# Patient Record
Sex: FEMALE | Race: WHITE | NOT HISPANIC OR LATINO | Employment: FULL TIME | URBAN - METROPOLITAN AREA
[De-identification: names, ages, dates, MRNs, and addresses within clinical notes are randomized per-mention and may not be internally consistent; named-entity substitution may affect disease eponyms.]

---

## 2023-09-15 PROBLEM — N20.1 URETERIC STONE: Status: ACTIVE | Noted: 2023-09-15

## 2023-09-15 PROBLEM — I45.9 SKIPPED HEART BEATS: Status: ACTIVE | Noted: 2023-09-15

## 2023-09-15 PROBLEM — E78.1 PURE HYPERGLYCERIDEMIA: Status: ACTIVE | Noted: 2023-09-15

## 2023-09-15 PROBLEM — K21.9 GASTROESOPHAGEAL REFLUX DISEASE WITHOUT ESOPHAGITIS: Status: ACTIVE | Noted: 2023-09-15

## 2023-09-15 PROBLEM — R94.6 ABNORMAL THYROID FUNCTION TEST: Status: ACTIVE | Noted: 2023-09-15

## 2023-09-15 PROBLEM — N92.6 IRREGULAR MENSES: Status: ACTIVE | Noted: 2023-09-15

## 2023-09-15 PROBLEM — D84.9 IMMUNOSUPPRESSED STATUS (MULTI): Status: ACTIVE | Noted: 2023-09-15

## 2023-09-15 PROBLEM — N20.0 KIDNEY STONE: Status: ACTIVE | Noted: 2023-09-15

## 2023-09-15 PROBLEM — F41.9 ANXIETY: Status: ACTIVE | Noted: 2023-09-15

## 2023-09-15 PROBLEM — J45.909 ASTHMA (HHS-HCC): Status: ACTIVE | Noted: 2023-09-15

## 2023-09-15 PROBLEM — B00.9 HERPES SIMPLEX TYPE 1 INFECTION: Status: ACTIVE | Noted: 2023-09-15

## 2023-09-15 PROBLEM — L01.00 IMPETIGO: Status: ACTIVE | Noted: 2023-09-15

## 2023-09-15 PROBLEM — J20.8 ACUTE BRONCHITIS DUE TO OTHER SPECIFIED ORGANISMS: Status: ACTIVE | Noted: 2023-09-15

## 2023-09-15 RX ORDER — VALACYCLOVIR HYDROCHLORIDE 500 MG/1
1 TABLET, FILM COATED ORAL DAILY
COMMUNITY
Start: 2018-02-14 | End: 2023-10-16

## 2023-09-15 RX ORDER — ALBUTEROL SULFATE 0.83 MG/ML
3 SOLUTION RESPIRATORY (INHALATION) EVERY 8 HOURS PRN
COMMUNITY
Start: 2016-03-15

## 2023-09-15 RX ORDER — ALBUTEROL SULFATE 90 UG/1
2 AEROSOL, METERED RESPIRATORY (INHALATION) EVERY 6 HOURS PRN
COMMUNITY
Start: 2016-03-15 | End: 2023-12-15

## 2023-09-15 RX ORDER — BUDESONIDE AND FORMOTEROL FUMARATE DIHYDRATE 160; 4.5 UG/1; UG/1
2 AEROSOL RESPIRATORY (INHALATION)
COMMUNITY
Start: 2016-03-15

## 2023-09-15 RX ORDER — CIPROFLOXACIN 500 MG/1
1 TABLET ORAL 2 TIMES DAILY
COMMUNITY
End: 2023-10-24 | Stop reason: ALTCHOICE

## 2023-09-15 RX ORDER — MEDROXYPROGESTERONE ACETATE 150 MG/ML
1 INJECTION, SUSPENSION INTRAMUSCULAR
COMMUNITY
Start: 2021-01-13 | End: 2023-10-24 | Stop reason: ALTCHOICE

## 2023-09-15 RX ORDER — NORETHINDRONE ACETATE AND ETHINYL ESTRADIOL, ETHINYL ESTRADIOL AND FERROUS FUMARATE 1MG-10(24)
1 KIT ORAL DAILY
COMMUNITY
Start: 2018-02-14 | End: 2023-10-24 | Stop reason: ALTCHOICE

## 2023-09-15 RX ORDER — OMEPRAZOLE 40 MG/1
1 CAPSULE, DELAYED RELEASE ORAL
COMMUNITY
Start: 2020-06-26

## 2023-09-15 RX ORDER — ALPRAZOLAM 0.25 MG/1
1 TABLET ORAL DAILY
COMMUNITY
Start: 2023-02-16

## 2023-09-15 RX ORDER — TAMSULOSIN HYDROCHLORIDE 0.4 MG/1
1 CAPSULE ORAL DAILY
COMMUNITY
End: 2023-10-24 | Stop reason: ALTCHOICE

## 2023-09-15 RX ORDER — PHENTERMINE HCL 100 %
1 POWDER (GRAM) MISCELLANEOUS DAILY
COMMUNITY
End: 2023-10-24 | Stop reason: ALTCHOICE

## 2023-09-15 RX ORDER — IPRATROPIUM BROMIDE AND ALBUTEROL SULFATE 2.5; .5 MG/3ML; MG/3ML
SOLUTION RESPIRATORY (INHALATION)
COMMUNITY
Start: 2016-03-15 | End: 2023-10-24 | Stop reason: ALTCHOICE

## 2023-09-15 RX ORDER — MONTELUKAST SODIUM 10 MG/1
1 TABLET ORAL DAILY
COMMUNITY
Start: 2019-11-13

## 2023-09-15 RX ORDER — MECLIZINE HYDROCHLORIDE 25 MG/1
1 TABLET ORAL DAILY PRN
COMMUNITY
Start: 2021-12-28 | End: 2023-10-24 | Stop reason: ALTCHOICE

## 2023-09-15 RX ORDER — HYDROCODONE BITARTRATE AND ACETAMINOPHEN 5; 325 MG/1; MG/1
1 TABLET ORAL EVERY 6 HOURS PRN
COMMUNITY
End: 2023-10-24 | Stop reason: ALTCHOICE

## 2023-09-15 RX ORDER — SERTRALINE HYDROCHLORIDE 100 MG/1
TABLET, FILM COATED ORAL DAILY
COMMUNITY
Start: 2020-08-31 | End: 2024-03-01

## 2023-09-15 RX ORDER — EPINEPHRINE 0.3 MG/.3ML
INJECTION INTRAMUSCULAR
COMMUNITY

## 2023-10-11 RX ORDER — SERTRALINE HYDROCHLORIDE 100 MG/1
100 TABLET, FILM COATED ORAL DAILY
COMMUNITY
Start: 2020-08-31 | End: 2023-10-24 | Stop reason: ALTCHOICE

## 2023-10-17 ENCOUNTER — OFFICE VISIT (OUTPATIENT)
Dept: PRIMARY CARE | Facility: CLINIC | Age: 28
End: 2023-10-17
Payer: COMMERCIAL

## 2023-10-17 VITALS
OXYGEN SATURATION: 98 % | SYSTOLIC BLOOD PRESSURE: 110 MMHG | BODY MASS INDEX: 38.51 KG/M2 | RESPIRATION RATE: 18 BRPM | HEART RATE: 97 BPM | HEIGHT: 61 IN | WEIGHT: 204 LBS | DIASTOLIC BLOOD PRESSURE: 60 MMHG

## 2023-10-17 DIAGNOSIS — E66.09 CLASS 2 OBESITY DUE TO EXCESS CALORIES WITHOUT SERIOUS COMORBIDITY WITH BODY MASS INDEX (BMI) OF 38.0 TO 38.9 IN ADULT: ICD-10-CM

## 2023-10-17 DIAGNOSIS — Z72.0 TOBACCO USE: ICD-10-CM

## 2023-10-17 DIAGNOSIS — R30.0 DYSURIA: Primary | ICD-10-CM

## 2023-10-17 DIAGNOSIS — A74.9 CHLAMYDIA INFECTION: ICD-10-CM

## 2023-10-17 LAB
POC APPEARANCE, URINE: CLEAR
POC BILIRUBIN, URINE: NEGATIVE
POC BLOOD, URINE: NEGATIVE
POC COLOR, URINE: YELLOW
POC GLUCOSE, URINE: NEGATIVE MG/DL
POC KETONES, URINE: NEGATIVE MG/DL
POC LEUKOCYTES, URINE: NEGATIVE
POC NITRITE,URINE: NEGATIVE
POC PH, URINE: 7 PH
POC PROTEIN, URINE: ABNORMAL MG/DL
POC SPECIFIC GRAVITY, URINE: 1.02
POC UROBILINOGEN, URINE: 0.2 EU/DL

## 2023-10-17 PROCEDURE — 81002 URINALYSIS NONAUTO W/O SCOPE: CPT | Performed by: NURSE PRACTITIONER

## 2023-10-17 PROCEDURE — 87800 DETECT AGNT MULT DNA DIREC: CPT | Performed by: NURSE PRACTITIONER

## 2023-10-17 PROCEDURE — 99214 OFFICE O/P EST MOD 30 MIN: CPT | Performed by: NURSE PRACTITIONER

## 2023-10-17 PROCEDURE — 3008F BODY MASS INDEX DOCD: CPT | Performed by: NURSE PRACTITIONER

## 2023-10-17 PROCEDURE — 87086 URINE CULTURE/COLONY COUNT: CPT | Performed by: NURSE PRACTITIONER

## 2023-10-17 RX ORDER — VALACYCLOVIR HYDROCHLORIDE 1 G/1
TABLET, FILM COATED ORAL DAILY
COMMUNITY
Start: 2023-04-13 | End: 2023-11-09 | Stop reason: SDUPTHER

## 2023-10-17 RX ORDER — BUPROPION HYDROCHLORIDE 150 MG/1
150 TABLET ORAL EVERY MORNING
Qty: 30 TABLET | Refills: 1 | Status: SHIPPED | OUTPATIENT
Start: 2023-10-17 | End: 2023-11-20

## 2023-10-17 RX ORDER — SEMAGLUTIDE 0.5 MG/.5ML
0.5 INJECTION, SOLUTION SUBCUTANEOUS
Qty: 2 ML | Refills: 2 | Status: SHIPPED | OUTPATIENT
Start: 2023-10-17

## 2023-10-17 ASSESSMENT — PATIENT HEALTH QUESTIONNAIRE - PHQ9
1. LITTLE INTEREST OR PLEASURE IN DOING THINGS: NOT AT ALL
SUM OF ALL RESPONSES TO PHQ9 QUESTIONS 1 AND 2: 0
2. FEELING DOWN, DEPRESSED OR HOPELESS: NOT AT ALL

## 2023-10-17 ASSESSMENT — ENCOUNTER SYMPTOMS
LOSS OF SENSATION IN FEET: 0
DEPRESSION: 0
OCCASIONAL FEELINGS OF UNSTEADINESS: 0

## 2023-10-17 NOTE — PROGRESS NOTES
"Subjective   Patient ID: Magda Negron is a 27 y.o. female who presents for UTI (Also c/o of weight loss and trying to quit smoking.).    HPI concern about weight gain and discuss weight loss options Hx PCOS, recent burning with urination. New sexual partner. Hx of chymadia  wants to stop smoking     Review of Systems    Objective   /60 (BP Location: Right arm, Patient Position: Sitting, BP Cuff Size: Adult)   Pulse 97   Resp 18   Ht 1.549 m (5' 1\")   Wt 92.5 kg (204 lb)   SpO2 98%   BMI 38.55 kg/m²     Physical Exam  Constitutional:       Appearance: She is obese.   Cardiovascular:      Rate and Rhythm: Normal rate and regular rhythm.   Pulmonary:      Effort: Pulmonary effort is normal.      Breath sounds: Normal breath sounds.   Abdominal:      General: Bowel sounds are normal.      Palpations: Abdomen is soft.      Tenderness: There is no abdominal tenderness.   Skin:     General: Skin is warm.   Neurological:      Mental Status: She is alert.   Psychiatric:         Mood and Affect: Mood normal.         Assessment/Plan   Diagnoses and all orders for this visit:  Dysuria  -     POCT UA (nonautomated) manually resulted  -     C. Trachomatis / N. Gonorrhoeae, Amplified Detection  -     Urine Culture  Tobacco use  -     buPROPion XL (Wellbutrin XL) 150 mg 24 hr tablet; Take 1 tablet (150 mg) by mouth once daily in the morning. Do not crush, chew, or split.  Class 2 obesity due to excess calories without serious comorbidity with body mass index (BMI) of 38.0 to 38.9 in adult  -     semaglutide, weight loss, (Wegovy) 0.5 mg/0.5 mL pen injector; Inject 0.5 mg under the skin every 7 days.  Chlamydia infection  -     doxycycline (Monodox) 100 mg capsule; Take 1 capsule (100 mg) by mouth 2 times a day for 10 days. Take with at least 8 ounces (large glass) of water, do not lie down for 30 minutes after         "

## 2023-10-18 LAB
C TRACH RRNA SPEC QL NAA+PROBE: POSITIVE
N GONORRHOEA DNA SPEC QL PROBE+SIG AMP: NEGATIVE

## 2023-10-18 RX ORDER — DOXYCYCLINE 100 MG/1
100 CAPSULE ORAL 2 TIMES DAILY
Qty: 20 CAPSULE | Refills: 0 | Status: SHIPPED | OUTPATIENT
Start: 2023-10-18 | End: 2023-10-24 | Stop reason: ALTCHOICE

## 2023-10-19 LAB — BACTERIA UR CULT: NORMAL

## 2023-10-24 ENCOUNTER — OFFICE VISIT (OUTPATIENT)
Dept: OTOLARYNGOLOGY | Facility: CLINIC | Age: 28
End: 2023-10-24
Payer: COMMERCIAL

## 2023-10-24 VITALS — WEIGHT: 200 LBS | BODY MASS INDEX: 36.8 KG/M2 | HEIGHT: 62 IN

## 2023-10-24 DIAGNOSIS — G47.33 OBSTRUCTIVE SLEEP APNEA: ICD-10-CM

## 2023-10-24 DIAGNOSIS — J03.91 RECURRENT TONSILLITIS: Primary | ICD-10-CM

## 2023-10-24 DIAGNOSIS — R06.83 SNORING: ICD-10-CM

## 2023-10-24 DIAGNOSIS — R40.0 DAYTIME SOMNOLENCE: ICD-10-CM

## 2023-10-24 PROCEDURE — 4004F PT TOBACCO SCREEN RCVD TLK: CPT | Performed by: OTOLARYNGOLOGY

## 2023-10-24 PROCEDURE — 3008F BODY MASS INDEX DOCD: CPT | Performed by: OTOLARYNGOLOGY

## 2023-10-24 PROCEDURE — 99203 OFFICE O/P NEW LOW 30 MIN: CPT | Performed by: OTOLARYNGOLOGY

## 2023-10-24 NOTE — PROGRESS NOTES
Chief Complaint   Patient presents with    New Patient Visit     NP- TONSIL CK     HPI:  Magda Negron is a 27 y.o. female who presents with complaints of getting some recurrent tonsillitis over the past year or 2.  Has happened about 4 times a year.  Symptoms are white spots on the throat with sore throat and feeling ill.  No complications.  Negative strep.  Gets better with a Z-Rkishna.  Also having some significant snoring, possible sleep apnea, daytime somnolence.    PMH:  Past Medical History:   Diagnosis Date    Allergy to peanuts     History of peanut allergy    Allergy to seafood     History of allergy to seafood    Asthma     GERD (gastroesophageal reflux disease)     Headache     Obesity      Past Surgical History:   Procedure Laterality Date    KIDNEY STONE SURGERY  2021    OTHER SURGICAL HISTORY  07/16/2018    Treatment Of Elbow Fracture    WISDOM TOOTH EXTRACTION  2022         Medications:     Current Outpatient Medications:     albuterol 2.5 mg /3 mL (0.083 %) nebulizer solution, Take 3 mL by nebulization every 8 hours if needed., Disp: , Rfl:     albuterol 90 mcg/actuation inhaler, Inhale 2 puffs every 6 hours if needed., Disp: , Rfl:     ALPRAZolam (Xanax) 0.25 mg tablet, Take 1 tablet (0.25 mg) by mouth once daily., Disp: , Rfl:     budesonide-formoteroL (Symbicort) 160-4.5 mcg/actuation inhaler, Inhale 2 puffs 2 times a day., Disp: , Rfl:     buPROPion XL (Wellbutrin XL) 150 mg 24 hr tablet, Take 1 tablet (150 mg) by mouth once daily in the morning. Do not crush, chew, or split., Disp: 30 tablet, Rfl: 1    diazePAM (Valium) 10 mg tablet, Take 1 tablet (10 mg) by mouth every 6 hours if needed., Disp: , Rfl:     EPINEPHrine (EpiPen) 0.3 mg/0.3 mL injection syringe, as directed Injection as directed, Disp: , Rfl:     montelukast (Singulair) 10 mg tablet, Take 1 tablet (10 mg) by mouth once daily., Disp: , Rfl:     Nexplanon 68 mg implant implant, 1 each by subdermal route 1 time., Disp: , Rfl:      "omeprazole (PriLOSEC) 40 mg DR capsule, Take 1 capsule (40 mg) by mouth once daily in the morning. Take before meals., Disp: , Rfl:     semaglutide, weight loss, (Wegovy) 0.5 mg/0.5 mL pen injector, Inject 0.5 mg under the skin every 7 days., Disp: 2 mL, Rfl: 2    sertraline (Zoloft) 100 mg tablet, Take by mouth once daily., Disp: , Rfl:     valACYclovir (Valtrex) 1 gram tablet, Take by mouth once daily., Disp: , Rfl:      Allergies:  Allergies   Allergen Reactions    Peanut Anaphylaxis    Penicillin Anaphylaxis and Hives    Penicillins Hives and Rash    Amoxicillin Unknown     SEVERITY UNKNOWN    Shellfish Derived Unknown     SEVERITY UNKNOWN        ROS:  Review of systems normal unless stated otherwise in the HPI and/or PMH.    Physical Exam:  Height 1.575 m (5' 2\"), weight 90.7 kg (200 lb). Body mass index is 36.58 kg/m².     GENERAL APPEARANCE: Well developed and well nourished.  Alert and oriented in no acute distress.  Normal vocal quality.      HEAD/FACE: No erythema or edema or facial tenderness.  Normal facial nerve function bilaterally.    EAR:       EXTERNAL: Normal pinnas and external auditory canals without lesion or obstructing wax.       MIDDLE EAR: Tympanic membranes intact and mobile with normal landmarks.  Middle ear space appears well aerated.       TUBE STATUS: N/A       MASTOID CAVITY: N/A       HEARING: Gross hearing assessment is within normal limits.      NOSE:       VISUALIZED USING: Anterior rhinoscopy with headlight and nasal speculum.       DORSUM: Midline, nontraumatic appearance.       MUCOSA: Normal-appearing.       SECRETIONS: Normal.       SEPTUM: Midline and nonobstructing.       INFERIOR TURBINATES: Normal.       MIDDLE TURBINATES/MEATUS: N/A       BLEEDING: N/A         ORAL CAVITY/PHARYNX:       TEETH: Adequate dentition.       TONGUE: No mass or lesion.  Normal mobility.       FLOOR OF MOUTH: No mass or lesion.       PALATE: Normal hard palate, soft palate, and uvula.  Mallampati " 3-4       OROPHARYNX: Normal without mass or lesion.  Tonsils 2+       BUCCAL MUCOSA/GBS: Normal without mass or lesion.       LIPS: Normal.    LARYNX/HYPOPHARYNX/NASOPHARYNX: N/A    NECK: No palpable masses or abnormal adenopathy.  Trachea is midline.    THYROID: No thyromegaly or palpable nodule.    SALIVARY GLANDS: Normal bilateral parotid and submandibular glands by inspection and palpation.    TMJ's: Normal.    NEURO: Cranial nerve exam grossly normal bilaterally.       Assessment/Plan   Magda was seen today for new patient visit.  Diagnoses and all orders for this visit:  Recurrent tonsillitis (Primary)  Obstructive sleep apnea  -     Home sleep apnea test (HSAT)  Snoring  Daytime somnolence     Having some recurrent tonsillitis but at this point not to the degree to recommend tonsillectomy.  Recommend tonsil with control, healthy lifestyle, eating habits, vitamins, and avoidance of sick exposures.  Clinically has a history of obstructive sleep apnea.  Educated about this.  We will check a sleep study.  Follow up for test results after testing is complete.     Mirza Wen MD

## 2023-10-25 ENCOUNTER — PROCEDURE VISIT (OUTPATIENT)
Dept: OBSTETRICS AND GYNECOLOGY | Facility: CLINIC | Age: 28
End: 2023-10-25
Payer: COMMERCIAL

## 2023-10-25 VITALS — DIASTOLIC BLOOD PRESSURE: 84 MMHG | SYSTOLIC BLOOD PRESSURE: 122 MMHG | BODY MASS INDEX: 38.23 KG/M2 | WEIGHT: 209 LBS

## 2023-10-25 DIAGNOSIS — Z20.2 CHLAMYDIA CONTACT, TREATED: Primary | ICD-10-CM

## 2023-10-25 DIAGNOSIS — Z30.017 NEXPLANON INSERTION: ICD-10-CM

## 2023-10-25 LAB — PREGNANCY TEST URINE, POC: NEGATIVE

## 2023-10-25 PROCEDURE — 11981 INSERTION DRUG DLVR IMPLANT: CPT | Performed by: OBSTETRICS & GYNECOLOGY

## 2023-10-25 PROCEDURE — 2500000004 HC RX 250 GENERAL PHARMACY W/ HCPCS (ALT 636 FOR OP/ED): Performed by: OBSTETRICS & GYNECOLOGY

## 2023-10-25 PROCEDURE — 99999 PR OFFICE/OUTPT VISIT,PROCEDURE ONLY: CPT | Performed by: OBSTETRICS & GYNECOLOGY

## 2023-10-25 PROCEDURE — 81025 URINE PREGNANCY TEST: CPT | Performed by: OBSTETRICS & GYNECOLOGY

## 2023-10-25 RX ADMIN — ETONOGESTREL 1 EACH: 68 IMPLANT SUBCUTANEOUS at 10:44

## 2023-10-25 ASSESSMENT — PATIENT HEALTH QUESTIONNAIRE - PHQ9
2. FEELING DOWN, DEPRESSED OR HOPELESS: NOT AT ALL
SUM OF ALL RESPONSES TO PHQ9 QUESTIONS 1 AND 2: 0
1. LITTLE INTEREST OR PLEASURE IN DOING THINGS: NOT AT ALL

## 2023-10-25 ASSESSMENT — ENCOUNTER SYMPTOMS
LOSS OF SENSATION IN FEET: 0
DEPRESSION: 0
OCCASIONAL FEELINGS OF UNSTEADINESS: 0

## 2023-10-25 NOTE — PROGRESS NOTES
Patient ID: Magda Negron is a 27 y.o. female.    Insertion of Contraceptive Capsule    Date/Time: 10/25/2023 10:45 AM    Performed by: Janice Kovacs DO  Authorized by: Janice Kovacs DO    Consent:     Consent obtained:  Verbal and written    Consent given by:  Patient    Procedural risks discussed:  Bleeding, failure rate and repeat procedure    Patient questions answered: yes      Patient agrees, verbalizes understanding, and wants to proceed: yes      Educational handouts given: yes      Instructions and paperwork completed: yes    Indication:     Indication: Insertion of non-biodegradable drug delivery implant    Pre-procedure:     Pre-procedure timeout performed: yes      Prepped with: povidone-iodine      Local anesthetic:  Lidocaine with epinephrine    The site was cleaned and prepped in a sterile fashion: yes    Procedure:     Procedure:  Insertion    Small stab incision was made in arm: yes      Preloaded contraceptive capsule trocar was placed subdermally: yes      Visualization of implant was obtained: yes      Contraceptive capsule was inserted and trocar removed: yes      Visualization of notch in stylet and palpation of device: yes      Palpation confirms placement by provider and patient: yes      Site was closed with steri-strips and pressure bandage applied: yes

## 2023-11-09 DIAGNOSIS — B00.9 HERPES SIMPLEX TYPE 1 INFECTION: Primary | ICD-10-CM

## 2023-11-09 RX ORDER — VALACYCLOVIR HYDROCHLORIDE 1 G/1
500 TABLET, FILM COATED ORAL DAILY
Qty: 30 TABLET | Refills: 6 | Status: SHIPPED | OUTPATIENT
Start: 2023-11-09

## 2023-11-10 ENCOUNTER — DOCUMENTATION (OUTPATIENT)
Dept: PRIMARY CARE | Facility: CLINIC | Age: 28
End: 2023-11-10
Payer: COMMERCIAL

## 2023-11-10 NOTE — PROGRESS NOTES
Pt called answering service with complaints of diarrhea and vomiting.  Pt reported that she had vomited 2 times but now was able to keep clear liquids down.  Pt reported that when she eats, the pain is worse.  She reported pain to be epigastric.  Advised pt to maintain clear liquid diet for the next 24 hours to allow stomach to rest.  Maintain hydration d/t diarrhea.  Told her she could take an OTC antacid and imodium.  If s/s continue contact office in the morning to schedule appointment.

## 2023-11-19 DIAGNOSIS — Z72.0 TOBACCO USE: ICD-10-CM

## 2023-11-20 RX ORDER — BUPROPION HYDROCHLORIDE 150 MG/1
150 TABLET ORAL EVERY MORNING
Qty: 30 TABLET | Refills: 6 | Status: SHIPPED | OUTPATIENT
Start: 2023-11-20 | End: 2024-03-19

## 2023-12-14 DIAGNOSIS — J45.909 UNSPECIFIED ASTHMA, UNCOMPLICATED (HHS-HCC): ICD-10-CM

## 2023-12-15 RX ORDER — ALBUTEROL SULFATE 90 UG/1
AEROSOL, METERED RESPIRATORY (INHALATION)
Qty: 18 G | Refills: 6 | Status: SHIPPED | OUTPATIENT
Start: 2023-12-15

## 2024-01-04 ENCOUNTER — TELEPHONE (OUTPATIENT)
Dept: PRIMARY CARE | Facility: CLINIC | Age: 29
End: 2024-01-04
Payer: COMMERCIAL

## 2024-01-22 ENCOUNTER — CLINICAL SUPPORT (OUTPATIENT)
Dept: SLEEP MEDICINE | Facility: HOSPITAL | Age: 29
End: 2024-01-22
Payer: COMMERCIAL

## 2024-01-22 DIAGNOSIS — G47.33 OBSTRUCTIVE SLEEP APNEA: Primary | ICD-10-CM

## 2024-01-22 PROCEDURE — 95806 SLEEP STUDY UNATT&RESP EFFT: CPT | Performed by: PSYCHIATRY & NEUROLOGY

## 2024-03-01 DIAGNOSIS — F41.9 ANXIETY DISORDER, UNSPECIFIED: ICD-10-CM

## 2024-03-01 RX ORDER — SERTRALINE HYDROCHLORIDE 100 MG/1
50 TABLET, FILM COATED ORAL DAILY
Qty: 45 TABLET | Refills: 3 | Status: SHIPPED | OUTPATIENT
Start: 2024-03-01

## 2024-03-18 ENCOUNTER — E-VISIT (OUTPATIENT)
Dept: PRIMARY CARE | Facility: CLINIC | Age: 29
End: 2024-03-18

## 2024-03-18 ENCOUNTER — HOSPITAL ENCOUNTER (OUTPATIENT)
Dept: RADIOLOGY | Facility: CLINIC | Age: 29
Discharge: HOME | End: 2024-03-18
Payer: COMMERCIAL

## 2024-03-18 ENCOUNTER — OFFICE VISIT (OUTPATIENT)
Dept: PRIMARY CARE | Facility: CLINIC | Age: 29
End: 2024-03-18
Payer: COMMERCIAL

## 2024-03-18 VITALS — WEIGHT: 230 LBS | BODY MASS INDEX: 42.07 KG/M2

## 2024-03-18 DIAGNOSIS — F50.81 BINGE EATING DISORDER: Primary | ICD-10-CM

## 2024-03-18 DIAGNOSIS — J45.30 MILD PERSISTENT ASTHMA WITHOUT COMPLICATION (HHS-HCC): ICD-10-CM

## 2024-03-18 DIAGNOSIS — R06.02 SHORTNESS OF BREATH: ICD-10-CM

## 2024-03-18 DIAGNOSIS — N92.6 IRREGULAR MENSES: ICD-10-CM

## 2024-03-18 DIAGNOSIS — F50.81 BINGE EATING DISORDER: ICD-10-CM

## 2024-03-18 LAB
ALBUMIN SERPL-MCNC: 4.1 G/DL (ref 3.5–5)
ALP BLD-CCNC: 55 U/L (ref 35–125)
ALT SERPL-CCNC: 34 U/L (ref 5–40)
ANION GAP SERPL CALC-SCNC: 14 MMOL/L
AST SERPL-CCNC: 20 U/L (ref 5–40)
BILIRUB SERPL-MCNC: 0.3 MG/DL (ref 0.1–1.2)
BUN SERPL-MCNC: 14 MG/DL (ref 8–25)
CALCIUM SERPL-MCNC: 9.3 MG/DL (ref 8.5–10.4)
CHLORIDE SERPL-SCNC: 105 MMOL/L (ref 97–107)
CHOLEST SERPL-MCNC: 215 MG/DL (ref 133–200)
CHOLEST/HDLC SERPL: 4.8 {RATIO}
CO2 SERPL-SCNC: 23 MMOL/L (ref 24–31)
CREAT SERPL-MCNC: 0.7 MG/DL (ref 0.4–1.6)
EGFRCR SERPLBLD CKD-EPI 2021: >90 ML/MIN/1.73M*2
EST. AVERAGE GLUCOSE BLD GHB EST-MCNC: 105 MG/DL
GLUCOSE SERPL-MCNC: 86 MG/DL (ref 65–99)
HBA1C MFR BLD: 5.3 %
HDLC SERPL-MCNC: 45 MG/DL
INSULIN SERPL-ACNC: 25 UIU/ML (ref 3–25)
LDLC SERPL CALC-MCNC: 127 MG/DL (ref 65–130)
POTASSIUM SERPL-SCNC: 4.3 MMOL/L (ref 3.4–5.1)
PROT SERPL-MCNC: 7.1 G/DL (ref 5.9–7.9)
SODIUM SERPL-SCNC: 142 MMOL/L (ref 133–145)
TRIGL SERPL-MCNC: 217 MG/DL (ref 40–150)
TSH SERPL DL<=0.05 MIU/L-ACNC: 1.29 MIU/L (ref 0.27–4.2)

## 2024-03-18 PROCEDURE — 99214 OFFICE O/P EST MOD 30 MIN: CPT | Performed by: NURSE PRACTITIONER

## 2024-03-18 PROCEDURE — 84443 ASSAY THYROID STIM HORMONE: CPT | Performed by: NURSE PRACTITIONER

## 2024-03-18 PROCEDURE — 3008F BODY MASS INDEX DOCD: CPT | Performed by: NURSE PRACTITIONER

## 2024-03-18 PROCEDURE — 83525 ASSAY OF INSULIN: CPT | Mod: WESLAB | Performed by: NURSE PRACTITIONER

## 2024-03-18 PROCEDURE — 71046 X-RAY EXAM CHEST 2 VIEWS: CPT

## 2024-03-18 PROCEDURE — 83036 HEMOGLOBIN GLYCOSYLATED A1C: CPT | Performed by: NURSE PRACTITIONER

## 2024-03-18 PROCEDURE — 80053 COMPREHEN METABOLIC PANEL: CPT | Performed by: NURSE PRACTITIONER

## 2024-03-18 PROCEDURE — 80061 LIPID PANEL: CPT | Performed by: NURSE PRACTITIONER

## 2024-03-18 PROCEDURE — 36415 COLL VENOUS BLD VENIPUNCTURE: CPT | Performed by: NURSE PRACTITIONER

## 2024-03-18 RX ORDER — LISDEXAMFETAMINE DIMESYLATE 30 MG/1
30 CAPSULE ORAL EVERY MORNING
Qty: 30 CAPSULE | Refills: 0 | Status: SHIPPED | OUTPATIENT
Start: 2024-03-18 | End: 2024-03-19 | Stop reason: SDUPTHER

## 2024-03-18 ASSESSMENT — PATIENT HEALTH QUESTIONNAIRE - PHQ9
SUM OF ALL RESPONSES TO PHQ9 QUESTIONS 1 AND 2: 0
2. FEELING DOWN, DEPRESSED OR HOPELESS: NOT AT ALL
1. LITTLE INTEREST OR PLEASURE IN DOING THINGS: NOT AT ALL

## 2024-03-18 ASSESSMENT — PAIN SCALES - GENERAL: PAINLEVEL: 0-NO PAIN

## 2024-03-18 NOTE — PROGRESS NOTES
Subjective   Patient ID: Magda Negron is a 28 y.o. female who presents for Asthma (Patient here for asthma, she is wondering if her weight might be effecting it.). Issues with weight gain after going to health clinic for 3 months Semaglutide felt side effects wellbutrin stopped smoking , had 20 pound weight gain. Previous on phetermin but when she stopped she gained weight. Having fatigue  Asthma flaring up and symbicort is too costly a    HPI     Review of Systems    Objective   Wt 104 kg (230 lb)   BMI 42.07 kg/m²     Physical Exam  Vitals reviewed.   Constitutional:       General: She is not in acute distress.     Appearance: Normal appearance. She is obese.   Cardiovascular:      Rate and Rhythm: Normal rate and regular rhythm.      Heart sounds: No murmur heard.  Pulmonary:      Breath sounds: Normal breath sounds. No wheezing.   Abdominal:      Palpations: Abdomen is soft.   Musculoskeletal:         General: Normal range of motion.      Cervical back: Normal range of motion.   Skin:     General: Skin is warm.   Neurological:      Mental Status: She is alert.         Assessment/Plan   Problem List Items Addressed This Visit             ICD-10-CM    Irregular menses N92.6    Asthma J45.909    Relevant Medications    albuterol-budesonide (Airsupra) 90-80 mcg/actuation inhaler    Other Relevant Orders    Referral to Pulmonology    XR chest 2 views     Other Visit Diagnoses         Codes    Binge eating disorder    -  Primary F50.81    Relevant Medications    lisdexamfetamine (Vyvanse) 30 mg capsule    BMI 40.0-44.9, adult (CMS/HCC)     Z68.41    Relevant Orders    Hemoglobin A1C    Comprehensive Metabolic Panel    Lipid Panel    TSH with reflex to Free T4 if abnormal    Insulin, random    Shortness of breath     R06.02    Relevant Orders    Referral to Pulmonology    XR chest 2 views

## 2024-03-19 RX ORDER — LISDEXAMFETAMINE DIMESYLATE 30 MG/1
30 CAPSULE ORAL EVERY MORNING
Qty: 30 CAPSULE | Refills: 0 | Status: SHIPPED | OUTPATIENT
Start: 2024-03-19 | End: 2024-03-19 | Stop reason: SDUPTHER

## 2024-03-19 RX ORDER — LISDEXAMFETAMINE DIMESYLATE 30 MG/1
30 CAPSULE ORAL EVERY MORNING
Qty: 30 CAPSULE | Refills: 0 | Status: SHIPPED | OUTPATIENT
Start: 2024-03-19 | End: 2024-03-21

## 2024-03-20 DIAGNOSIS — E88.819 INSULIN RESISTANCE: Primary | ICD-10-CM

## 2024-03-20 RX ORDER — LIRAGLUTIDE 6 MG/ML
INJECTION, SOLUTION SUBCUTANEOUS
Qty: 15 ML | Refills: 2 | Status: SHIPPED | OUTPATIENT
Start: 2024-03-20

## 2024-03-20 RX ORDER — LIRAGLUTIDE 6 MG/ML
3 INJECTION, SOLUTION SUBCUTANEOUS DAILY
Qty: 15 ML | Refills: 2 | Status: SHIPPED | OUTPATIENT
Start: 2024-03-20 | End: 2024-06-18

## 2024-03-21 ENCOUNTER — TELEPHONE (OUTPATIENT)
Dept: PRIMARY CARE | Facility: CLINIC | Age: 29
End: 2024-03-21
Payer: COMMERCIAL

## 2024-03-21 RX ORDER — LISDEXAMFETAMINE DIMESYLATE 30 MG/1
30 CAPSULE ORAL EVERY MORNING
Qty: 30 CAPSULE | Refills: 0 | Status: SHIPPED | OUTPATIENT
Start: 2024-03-21 | End: 2024-04-19 | Stop reason: SDUPTHER

## 2024-04-05 ENCOUNTER — TELEMEDICINE (OUTPATIENT)
Dept: PRIMARY CARE | Facility: CLINIC | Age: 29
End: 2024-04-05
Payer: COMMERCIAL

## 2024-04-05 DIAGNOSIS — J20.8 ACUTE BRONCHITIS DUE TO OTHER SPECIFIED ORGANISMS: Primary | ICD-10-CM

## 2024-04-05 DIAGNOSIS — R06.2 WHEEZING: ICD-10-CM

## 2024-04-05 PROCEDURE — 99213 OFFICE O/P EST LOW 20 MIN: CPT | Performed by: NURSE PRACTITIONER

## 2024-04-05 PROCEDURE — 3008F BODY MASS INDEX DOCD: CPT | Performed by: NURSE PRACTITIONER

## 2024-04-05 RX ORDER — ALBUTEROL SULFATE 90 UG/1
2 AEROSOL, METERED RESPIRATORY (INHALATION) EVERY 4 HOURS PRN
Qty: 8 G | Refills: 11 | Status: SHIPPED | OUTPATIENT
Start: 2024-04-05 | End: 2025-04-05

## 2024-04-05 RX ORDER — BENZONATATE 100 MG/1
100 CAPSULE ORAL 3 TIMES DAILY PRN
Qty: 42 CAPSULE | Refills: 0 | Status: SHIPPED | OUTPATIENT
Start: 2024-04-05 | End: 2024-05-05

## 2024-04-05 RX ORDER — DOXYCYCLINE 100 MG/1
100 CAPSULE ORAL 2 TIMES DAILY
Qty: 20 CAPSULE | Refills: 0 | Status: SHIPPED | OUTPATIENT
Start: 2024-04-05 | End: 2024-04-15

## 2024-04-05 RX ORDER — METHYLPREDNISOLONE 4 MG/1
TABLET ORAL
Qty: 21 TABLET | Refills: 0 | Status: SHIPPED | OUTPATIENT
Start: 2024-04-05

## 2024-04-05 ASSESSMENT — ENCOUNTER SYMPTOMS
HEADACHES: 0
SORE THROAT: 1
DIARRHEA: 0
HOARSE VOICE: 1
NECK PAIN: 0
COUGH: 1
ABDOMINAL PAIN: 0
SHORTNESS OF BREATH: 0

## 2024-04-05 ASSESSMENT — PAIN SCALES - GENERAL: PAINLEVEL: 8

## 2024-04-05 NOTE — PROGRESS NOTES
Subjective   Patient ID: Magda Negron is a 28 y.o. female who presents for Chest Pain (Patient started with sinus pressure and was on a Zpack. She is having sharp chest pain and coughing worsens the pain).    Chest Pain   Associated symptoms include a cough. Pertinent negatives include no abdominal pain, headaches or shortness of breath.   Sore Throat   This is a new problem. The current episode started in the past 7 days. The problem has been gradually worsening. The pain is worse on the right side. The maximum temperature recorded prior to her arrival was 100 - 100.9 F. The fever has been present for Less than 1 day. The pain is at a severity of 6/10. Associated symptoms include congestion, coughing, ear pain, a hoarse voice and a plugged ear sensation. Pertinent negatives include no abdominal pain, diarrhea, drooling, ear discharge, headaches, neck pain or shortness of breath. She has had no exposure to strep or mono.        Review of Systems   HENT:  Positive for congestion, ear pain, hoarse voice and sore throat. Negative for drooling and ear discharge.    Respiratory:  Positive for cough. Negative for shortness of breath.    Cardiovascular:  Positive for chest pain.   Gastrointestinal:  Negative for abdominal pain and diarrhea.   Musculoskeletal:  Negative for neck pain.   Neurological:  Negative for headaches.       Objective   There were no vitals taken for this visit.    Physical Exam    Assessment/Plan   Problem List Items Addressed This Visit             ICD-10-CM    Acute bronchitis due to other specified organisms - Primary J20.8    Relevant Medications    doxycycline (Monodox) 100 mg capsule    methylPREDNISolone (Medrol Dospak) 4 mg tablets    benzonatate (Tessalon) 100 mg capsule     Other Visit Diagnoses         Codes    Wheezing     R06.2    Relevant Medications    albuterol (Ventolin HFA) 90 mcg/actuation inhaler

## 2024-04-17 DIAGNOSIS — J45.909 UNSPECIFIED ASTHMA, UNCOMPLICATED (HHS-HCC): ICD-10-CM

## 2024-04-18 ENCOUNTER — TELEPHONE (OUTPATIENT)
Dept: OBSTETRICS AND GYNECOLOGY | Facility: CLINIC | Age: 29
End: 2024-04-18
Payer: COMMERCIAL

## 2024-04-18 RX ORDER — ALBUTEROL SULFATE 90 UG/1
AEROSOL, METERED RESPIRATORY (INHALATION)
Qty: 18 G | Refills: 6 | OUTPATIENT
Start: 2024-04-18

## 2024-04-18 NOTE — TELEPHONE ENCOUNTER
Pt never had a period with depo, she got nexplanon in October and she got her first period 4/10 it has been 8 days regular flow with cramping and she is concerned, is this normal?

## 2024-04-19 DIAGNOSIS — F50.81 BINGE EATING DISORDER: ICD-10-CM

## 2024-04-19 RX ORDER — LISDEXAMFETAMINE DIMESYLATE 30 MG/1
30 CAPSULE ORAL EVERY MORNING
Qty: 30 CAPSULE | Refills: 0 | Status: SHIPPED | OUTPATIENT
Start: 2024-04-19 | End: 2024-05-16 | Stop reason: SDUPTHER

## 2024-05-16 DIAGNOSIS — F50.81 BINGE EATING DISORDER: ICD-10-CM

## 2024-05-16 RX ORDER — LISDEXAMFETAMINE DIMESYLATE 30 MG/1
30 CAPSULE ORAL EVERY MORNING
Qty: 30 CAPSULE | Refills: 0 | Status: SHIPPED | OUTPATIENT
Start: 2024-05-16 | End: 2024-05-17 | Stop reason: SDUPTHER

## 2024-05-17 DIAGNOSIS — F50.81 BINGE EATING DISORDER: ICD-10-CM

## 2024-05-17 RX ORDER — LISDEXAMFETAMINE DIMESYLATE 30 MG/1
30 CAPSULE ORAL EVERY MORNING
Qty: 30 CAPSULE | Refills: 0 | Status: SHIPPED | OUTPATIENT
Start: 2024-05-17 | End: 2024-06-16

## 2024-06-17 ENCOUNTER — OFFICE VISIT (OUTPATIENT)
Dept: PRIMARY CARE | Facility: CLINIC | Age: 29
End: 2024-06-17
Payer: COMMERCIAL

## 2024-06-17 VITALS
DIASTOLIC BLOOD PRESSURE: 64 MMHG | BODY MASS INDEX: 41.96 KG/M2 | OXYGEN SATURATION: 98 % | SYSTOLIC BLOOD PRESSURE: 122 MMHG | WEIGHT: 228 LBS | HEART RATE: 97 BPM | RESPIRATION RATE: 17 BRPM | HEIGHT: 62 IN

## 2024-06-17 DIAGNOSIS — E88.819 INSULIN RESISTANCE: ICD-10-CM

## 2024-06-17 DIAGNOSIS — F90.0 ATTENTION DEFICIT HYPERACTIVITY DISORDER (ADHD), PREDOMINANTLY INATTENTIVE TYPE: ICD-10-CM

## 2024-06-17 DIAGNOSIS — J45.20 INTERMITTENT ASTHMA WITHOUT COMPLICATION, UNSPECIFIED ASTHMA SEVERITY (HHS-HCC): Primary | ICD-10-CM

## 2024-06-17 PROCEDURE — 3008F BODY MASS INDEX DOCD: CPT | Performed by: NURSE PRACTITIONER

## 2024-06-17 PROCEDURE — 99214 OFFICE O/P EST MOD 30 MIN: CPT | Performed by: NURSE PRACTITIONER

## 2024-06-17 RX ORDER — BUDESONIDE 0.25 MG/2ML
0.25 INHALANT ORAL
Qty: 30 ML | Refills: 11 | Status: SHIPPED | OUTPATIENT
Start: 2024-06-17 | End: 2025-06-17

## 2024-06-17 RX ORDER — DEXTROAMPHETAMINE SACCHARATE, AMPHETAMINE ASPARTATE, DEXTROAMPHETAMINE SULFATE AND AMPHETAMINE SULFATE 3.75; 3.75; 3.75; 3.75 MG/1; MG/1; MG/1; MG/1
15 TABLET ORAL DAILY
Qty: 30 TABLET | Refills: 0 | Status: SHIPPED | OUTPATIENT
Start: 2024-06-17 | End: 2024-07-17

## 2024-06-17 RX ORDER — METFORMIN HYDROCHLORIDE 850 MG/1
850 TABLET ORAL
Qty: 30 TABLET | Refills: 11 | Status: SHIPPED | OUTPATIENT
Start: 2024-06-17 | End: 2025-06-17

## 2024-06-17 ASSESSMENT — ENCOUNTER SYMPTOMS
LOSS OF SENSATION IN FEET: 0
DEPRESSION: 0
OCCASIONAL FEELINGS OF UNSTEADINESS: 0

## 2024-06-17 ASSESSMENT — PAIN SCALES - GENERAL: PAINLEVEL: 0-NO PAIN

## 2024-06-17 NOTE — PROGRESS NOTES
"Subjective   Patient ID: Magda Negron is a 28 y.o. female who presents for Discuss medications (Patient would like to discuss possible starting Metformin for insulin resistance. Injections were too expensive. Also would like to talk about alternatives for Vyavnse. ).  Issues   HPI     Review of Systems    Objective   /64 (BP Location: Right arm, Patient Position: Sitting, BP Cuff Size: Adult)   Pulse 97   Resp 17   Ht 1.575 m (5' 2\")   Wt 103 kg (228 lb)   SpO2 98%   BMI 41.70 kg/m²     Physical Exam  Constitutional:       General: She is not in acute distress.     Appearance: Normal appearance.   Cardiovascular:      Rate and Rhythm: Normal rate and regular rhythm.      Heart sounds: No murmur heard.  Pulmonary:      Breath sounds: Normal breath sounds. No wheezing.   Neurological:      Mental Status: She is alert.         Assessment/Plan   Problem List Items Addressed This Visit             ICD-10-CM    Asthma ([facility]) - Primary J45.909    Relevant Medications    budesonide (Pulmicort) 0.25 mg/2 mL nebulizer solution     Other Visit Diagnoses         Codes    Insulin resistance     E88.819    Relevant Medications    metFORMIN (Glucophage) 850 mg tablet    Attention deficit hyperactivity disorder (ADHD), predominantly inattentive type     F90.0    Relevant Medications    amphetamine-dextroamphetamine (Adderall) 15 mg tablet               "

## 2024-06-17 NOTE — PATIENT INSTRUCTIONS
OARRS reviewed, Consent obtained discussed risk dependence , random drug testing and face to face every 90 days pt agreeable

## 2024-07-08 DIAGNOSIS — F90.0 ATTENTION DEFICIT HYPERACTIVITY DISORDER (ADHD), PREDOMINANTLY INATTENTIVE TYPE: ICD-10-CM

## 2024-07-08 RX ORDER — DEXTROAMPHETAMINE SACCHARATE, AMPHETAMINE ASPARTATE MONOHYDRATE, DEXTROAMPHETAMINE SULFATE AND AMPHETAMINE SULFATE 3.75; 3.75; 3.75; 3.75 MG/1; MG/1; MG/1; MG/1
15 CAPSULE, EXTENDED RELEASE ORAL EVERY MORNING
Qty: 30 CAPSULE | Refills: 0 | Status: SHIPPED | OUTPATIENT
Start: 2024-07-08 | End: 2024-08-07

## 2024-07-08 RX ORDER — DEXTROAMPHETAMINE SACCHARATE, AMPHETAMINE ASPARTATE MONOHYDRATE, DEXTROAMPHETAMINE SULFATE AND AMPHETAMINE SULFATE 3.75; 3.75; 3.75; 3.75 MG/1; MG/1; MG/1; MG/1
15 CAPSULE, EXTENDED RELEASE ORAL EVERY MORNING
Qty: 30 CAPSULE | Refills: 0 | Status: SHIPPED | OUTPATIENT
Start: 2024-09-06 | End: 2024-10-06

## 2024-07-08 RX ORDER — DEXTROAMPHETAMINE SACCHARATE, AMPHETAMINE ASPARTATE MONOHYDRATE, DEXTROAMPHETAMINE SULFATE AND AMPHETAMINE SULFATE 3.75; 3.75; 3.75; 3.75 MG/1; MG/1; MG/1; MG/1
15 CAPSULE, EXTENDED RELEASE ORAL EVERY MORNING
Qty: 30 CAPSULE | Refills: 0 | Status: SHIPPED | OUTPATIENT
Start: 2024-08-07 | End: 2024-09-06

## 2024-07-08 RX ORDER — DEXTROAMPHETAMINE SACCHARATE, AMPHETAMINE ASPARTATE, DEXTROAMPHETAMINE SULFATE AND AMPHETAMINE SULFATE 3.75; 3.75; 3.75; 3.75 MG/1; MG/1; MG/1; MG/1
15 TABLET ORAL DAILY
Qty: 30 TABLET | Refills: 0 | Status: CANCELLED | OUTPATIENT
Start: 2024-07-08 | End: 2024-08-07

## 2024-07-08 NOTE — TELEPHONE ENCOUNTER
----- Message from Magda Negron sent at 7/7/2024 10:03 AM EDT -----  Regarding: Adderall   Contact: [phone]  I need a refill for my adderall sent to [facility]. I updated my pharmacy list. The last prescription was not the extended release can you make sure this one is?

## 2024-07-30 DIAGNOSIS — F90.0 ATTENTION DEFICIT HYPERACTIVITY DISORDER (ADHD), PREDOMINANTLY INATTENTIVE TYPE: Primary | ICD-10-CM

## 2024-07-30 RX ORDER — DEXTROAMPHETAMINE SACCHARATE, AMPHETAMINE ASPARTATE, DEXTROAMPHETAMINE SULFATE AND AMPHETAMINE SULFATE 5; 5; 5; 5 MG/1; MG/1; MG/1; MG/1
20 TABLET ORAL DAILY
Qty: 30 TABLET | Refills: 0 | Status: SHIPPED | OUTPATIENT
Start: 2024-07-30 | End: 2024-08-29

## 2024-08-12 DIAGNOSIS — J45.20 MILD INTERMITTENT ASTHMA WITHOUT COMPLICATION (HHS-HCC): Primary | ICD-10-CM

## 2024-08-12 RX ORDER — MONTELUKAST SODIUM 10 MG/1
10 TABLET ORAL DAILY
Qty: 30 TABLET | Refills: 11 | Status: SHIPPED | OUTPATIENT
Start: 2024-08-12

## 2024-08-19 DIAGNOSIS — K21.00 GASTROESOPHAGEAL REFLUX DISEASE WITH ESOPHAGITIS WITHOUT HEMORRHAGE: Primary | ICD-10-CM

## 2024-08-20 RX ORDER — OMEPRAZOLE 40 MG/1
CAPSULE, DELAYED RELEASE ORAL
Qty: 30 CAPSULE | Refills: 6 | Status: SHIPPED | OUTPATIENT
Start: 2024-08-20

## 2024-08-26 ENCOUNTER — OFFICE VISIT (OUTPATIENT)
Dept: PRIMARY CARE | Facility: CLINIC | Age: 29
End: 2024-08-26
Payer: COMMERCIAL

## 2024-08-26 VITALS
BODY MASS INDEX: 42.07 KG/M2 | HEART RATE: 89 BPM | SYSTOLIC BLOOD PRESSURE: 126 MMHG | DIASTOLIC BLOOD PRESSURE: 70 MMHG | OXYGEN SATURATION: 98 % | RESPIRATION RATE: 17 BRPM | WEIGHT: 230 LBS

## 2024-08-26 DIAGNOSIS — F41.9 ANXIETY DISORDER, UNSPECIFIED: ICD-10-CM

## 2024-08-26 DIAGNOSIS — R06.2 WHEEZING: ICD-10-CM

## 2024-08-26 DIAGNOSIS — J45.909 UNSPECIFIED ASTHMA, UNCOMPLICATED (HHS-HCC): ICD-10-CM

## 2024-08-26 PROCEDURE — 99213 OFFICE O/P EST LOW 20 MIN: CPT | Performed by: NURSE PRACTITIONER

## 2024-08-26 RX ORDER — ALBUTEROL SULFATE 90 UG/1
2 INHALANT RESPIRATORY (INHALATION) EVERY 4 HOURS PRN
Qty: 18 G | Refills: 1 | Status: SHIPPED | OUTPATIENT
Start: 2024-08-26

## 2024-08-26 RX ORDER — SERTRALINE HYDROCHLORIDE 25 MG/1
25 TABLET, FILM COATED ORAL DAILY
Qty: 30 TABLET | Refills: 2 | Status: SHIPPED | OUTPATIENT
Start: 2024-08-26 | End: 2024-11-24

## 2024-08-26 RX ORDER — ALBUTEROL SULFATE 90 UG/1
2 INHALANT RESPIRATORY (INHALATION) EVERY 4 HOURS PRN
Qty: 8 G | Refills: 11 | Status: SHIPPED | OUTPATIENT
Start: 2024-08-26 | End: 2025-08-26

## 2024-08-26 ASSESSMENT — PATIENT HEALTH QUESTIONNAIRE - PHQ9
SUM OF ALL RESPONSES TO PHQ9 QUESTIONS 1 AND 2: 0
1. LITTLE INTEREST OR PLEASURE IN DOING THINGS: NOT AT ALL
2. FEELING DOWN, DEPRESSED OR HOPELESS: NOT AT ALL

## 2024-08-26 ASSESSMENT — ENCOUNTER SYMPTOMS
LOSS OF SENSATION IN FEET: 0
OCCASIONAL FEELINGS OF UNSTEADINESS: 0
DEPRESSION: 0

## 2024-08-26 ASSESSMENT — PAIN SCALES - GENERAL: PAINLEVEL: 0-NO PAIN

## 2024-08-26 NOTE — PROGRESS NOTES
Subjective   Patient ID: Magda Negron is a 28 y.o. female who presents for Headache (Patient here to discuss meds, she's had HA for 2 months) and Med Refill (Epi-pen).  Having menstral cycle 3 months ago, has nexaplon and on metformin.   Issues with headachdes, living with boyfriend, now starting Ozempic injections   \did at home sleep study has snoring   Feeling like Metformin and adderall affecting headaches    Review of Systems    Objective   /70 (BP Location: Right arm, Patient Position: Sitting, BP Cuff Size: Adult)   Pulse 89   Resp 17   Wt 104 kg (230 lb)   SpO2 98%   BMI 42.07 kg/m²     Physical Exam  Constitutional:       General: She is not in acute distress.     Appearance: Normal appearance.   Cardiovascular:      Rate and Rhythm: Normal rate and regular rhythm.      Heart sounds: No murmur heard.  Pulmonary:      Breath sounds: Normal breath sounds. No wheezing.   Neurological:      General: No focal deficit present.      Mental Status: She is alert.         Assessment/Plan   Problem List Items Addressed This Visit    None  Visit Diagnoses         Codes    Anxiety disorder, unspecified     F41.9    Relevant Medications    sertraline (Zoloft) 25 mg tablet    Unspecified asthma, uncomplicated ([facility])     J45.909    Relevant Medications    albuterol 90 mcg/actuation inhaler    Wheezing     R06.2    Relevant Medications    albuterol (Ventolin HFA) 90 mcg/actuation inhaler          Restart Wellbutrin pt has medication at home for decrease Libido

## 2024-11-18 ENCOUNTER — OFFICE VISIT (OUTPATIENT)
Dept: PRIMARY CARE | Facility: CLINIC | Age: 29
End: 2024-11-18
Payer: COMMERCIAL

## 2024-11-18 VITALS
SYSTOLIC BLOOD PRESSURE: 122 MMHG | HEART RATE: 102 BPM | DIASTOLIC BLOOD PRESSURE: 68 MMHG | OXYGEN SATURATION: 98 % | RESPIRATION RATE: 17 BRPM

## 2024-11-18 DIAGNOSIS — R10.32 LEFT LOWER QUADRANT ABDOMINAL PAIN: Primary | ICD-10-CM

## 2024-11-18 LAB
POC APPEARANCE, URINE: CLEAR
POC BILIRUBIN, URINE: NEGATIVE
POC BLOOD, URINE: NEGATIVE
POC COLOR, URINE: YELLOW
POC GLUCOSE, URINE: NEGATIVE MG/DL
POC KETONES, URINE: NEGATIVE MG/DL
POC LEUKOCYTES, URINE: ABNORMAL
POC NITRITE,URINE: POSITIVE
POC PH, URINE: 6.5 PH
POC PROTEIN, URINE: NEGATIVE MG/DL
POC SPECIFIC GRAVITY, URINE: 1.01
POC UROBILINOGEN, URINE: 0.2 EU/DL

## 2024-11-18 PROCEDURE — 99213 OFFICE O/P EST LOW 20 MIN: CPT | Performed by: NURSE PRACTITIONER

## 2024-11-18 PROCEDURE — 87086 URINE CULTURE/COLONY COUNT: CPT | Performed by: NURSE PRACTITIONER

## 2024-11-18 PROCEDURE — 81003 URINALYSIS AUTO W/O SCOPE: CPT | Performed by: NURSE PRACTITIONER

## 2024-11-18 RX ORDER — NITROFURANTOIN 25; 75 MG/1; MG/1
100 CAPSULE ORAL 2 TIMES DAILY
Qty: 14 CAPSULE | Refills: 0 | Status: SHIPPED | OUTPATIENT
Start: 2024-11-18 | End: 2024-11-25

## 2024-11-18 ASSESSMENT — ENCOUNTER SYMPTOMS
DEPRESSION: 0
OCCASIONAL FEELINGS OF UNSTEADINESS: 0
LOSS OF SENSATION IN FEET: 0

## 2024-11-18 ASSESSMENT — PAIN SCALES - GENERAL: PAINLEVEL_OUTOF10: 2

## 2024-11-18 NOTE — PROGRESS NOTES
Subjective   Patient ID: Jamil Negron is a 28 y.o. female who presents for lower abd pain (Patient here for lower abd pain ).    HPI   Hx of kidney stones, symptoms worse yesterday , lower abdominal pain, constipation, needs to increase fluids, started metmusil   On Trizepid injection decrease appetite and weight loos  Review of Systems    Objective   /68 (BP Location: Right arm, Patient Position: Sitting, BP Cuff Size: Adult)   Pulse 102   Resp 17   SpO2 98%     Physical Exam  Constitutional:       General: She is not in acute distress.     Appearance: Normal appearance.   Cardiovascular:      Rate and Rhythm: Normal rate and regular rhythm.      Heart sounds: No murmur heard.  Pulmonary:      Breath sounds: Normal breath sounds. No wheezing.   Abdominal:      General: There is distension.      Tenderness: There is abdominal tenderness.   Neurological:      Mental Status: She is alert.         Assessment/Plan   Problem List Items Addressed This Visit    None  Visit Diagnoses         Codes    Left lower quadrant abdominal pain    -  Primary R10.32    Relevant Medications    nitrofurantoin, macrocrystal-monohydrate, (Macrobid) 100 mg capsule    Other Relevant Orders    POCT UA Automated manually resulted    Urine Culture

## 2024-11-19 ENCOUNTER — APPOINTMENT (OUTPATIENT)
Dept: PRIMARY CARE | Facility: CLINIC | Age: 29
End: 2024-11-19
Payer: COMMERCIAL

## 2024-11-19 ENCOUNTER — PATIENT MESSAGE (OUTPATIENT)
Dept: PRIMARY CARE | Facility: CLINIC | Age: 29
End: 2024-11-19
Payer: COMMERCIAL

## 2024-11-19 DIAGNOSIS — R11.0 NAUSEA: Primary | ICD-10-CM

## 2024-11-19 LAB — BACTERIA UR CULT: NORMAL

## 2024-11-21 RX ORDER — ONDANSETRON 8 MG/1
8 TABLET, ORALLY DISINTEGRATING ORAL EVERY 8 HOURS PRN
Qty: 20 TABLET | Refills: 0 | Status: SHIPPED | OUTPATIENT
Start: 2024-11-21 | End: 2024-11-28

## 2024-12-01 DIAGNOSIS — Z72.0 TOBACCO USE: ICD-10-CM

## 2024-12-01 DIAGNOSIS — F41.9 ANXIETY DISORDER, UNSPECIFIED: ICD-10-CM

## 2024-12-02 RX ORDER — BUPROPION HYDROCHLORIDE 150 MG/1
TABLET ORAL
Qty: 30 TABLET | Refills: 4 | Status: SHIPPED | OUTPATIENT
Start: 2024-12-02

## 2024-12-02 RX ORDER — SERTRALINE HYDROCHLORIDE 25 MG/1
25 TABLET, FILM COATED ORAL DAILY
Qty: 30 TABLET | Refills: 2 | Status: SHIPPED | OUTPATIENT
Start: 2024-12-02

## 2024-12-30 DIAGNOSIS — B00.9 HERPES SIMPLEX TYPE 1 INFECTION: ICD-10-CM

## 2024-12-30 RX ORDER — VALACYCLOVIR HYDROCHLORIDE 1 G/1
500 TABLET, FILM COATED ORAL DAILY
Qty: 75 TABLET | Refills: 0 | Status: SHIPPED | OUTPATIENT
Start: 2024-12-30

## 2025-01-27 ENCOUNTER — OFFICE VISIT (OUTPATIENT)
Dept: PRIMARY CARE | Facility: CLINIC | Age: 30
End: 2025-01-27
Payer: COMMERCIAL

## 2025-01-27 VITALS
WEIGHT: 196.4 LBS | OXYGEN SATURATION: 98 % | HEIGHT: 62 IN | DIASTOLIC BLOOD PRESSURE: 68 MMHG | TEMPERATURE: 97.8 F | SYSTOLIC BLOOD PRESSURE: 110 MMHG | BODY MASS INDEX: 36.14 KG/M2 | HEART RATE: 91 BPM

## 2025-01-27 DIAGNOSIS — E78.5 HYPERLIPIDEMIA, UNSPECIFIED HYPERLIPIDEMIA TYPE: ICD-10-CM

## 2025-01-27 DIAGNOSIS — E28.2 PCOS (POLYCYSTIC OVARIAN SYNDROME): ICD-10-CM

## 2025-01-27 DIAGNOSIS — L20.82 FLEXURAL ECZEMA: ICD-10-CM

## 2025-01-27 DIAGNOSIS — Z00.00 ROUTINE MEDICAL EXAM: ICD-10-CM

## 2025-01-27 DIAGNOSIS — R42 DIZZINESS: ICD-10-CM

## 2025-01-27 DIAGNOSIS — F32.81 PMDD (PREMENSTRUAL DYSPHORIC DISORDER): ICD-10-CM

## 2025-01-27 DIAGNOSIS — F41.9 ANXIETY: ICD-10-CM

## 2025-01-27 DIAGNOSIS — E55.9 VITAMIN D DEFICIENCY: ICD-10-CM

## 2025-01-27 DIAGNOSIS — R11.0 NAUSEA: ICD-10-CM

## 2025-01-27 DIAGNOSIS — D50.8 OTHER IRON DEFICIENCY ANEMIA: ICD-10-CM

## 2025-01-27 DIAGNOSIS — I10 PRIMARY HYPERTENSION: ICD-10-CM

## 2025-01-27 DIAGNOSIS — Z00.00 ROUTINE GENERAL MEDICAL EXAMINATION AT A HEALTH CARE FACILITY: Primary | ICD-10-CM

## 2025-01-27 PROCEDURE — 3008F BODY MASS INDEX DOCD: CPT | Performed by: NURSE PRACTITIONER

## 2025-01-27 PROCEDURE — 1036F TOBACCO NON-USER: CPT | Performed by: NURSE PRACTITIONER

## 2025-01-27 PROCEDURE — 3078F DIAST BP <80 MM HG: CPT | Performed by: NURSE PRACTITIONER

## 2025-01-27 PROCEDURE — 99395 PREV VISIT EST AGE 18-39: CPT | Performed by: NURSE PRACTITIONER

## 2025-01-27 PROCEDURE — 3074F SYST BP LT 130 MM HG: CPT | Performed by: NURSE PRACTITIONER

## 2025-01-27 RX ORDER — ONDANSETRON 8 MG/1
8 TABLET, ORALLY DISINTEGRATING ORAL EVERY 8 HOURS PRN
Qty: 20 TABLET | Refills: 1 | Status: SHIPPED | OUTPATIENT
Start: 2025-01-27 | End: 2025-02-03

## 2025-01-27 RX ORDER — ERGOCALCIFEROL 1.25 MG/1
50000 CAPSULE ORAL WEEKLY
Qty: 12 CAPSULE | Refills: 0 | Status: SHIPPED | OUTPATIENT
Start: 2025-01-27 | End: 2025-04-21

## 2025-01-27 RX ORDER — TRIAMCINOLONE ACETONIDE 1 MG/G
OINTMENT TOPICAL 2 TIMES DAILY PRN
Qty: 15 G | Refills: 3 | Status: SHIPPED | OUTPATIENT
Start: 2025-01-27 | End: 2025-05-27

## 2025-01-27 RX ORDER — FLUOXETINE HYDROCHLORIDE 20 MG/1
20 CAPSULE ORAL DAILY
Qty: 30 CAPSULE | Refills: 1 | Status: SHIPPED | OUTPATIENT
Start: 2025-01-27 | End: 2025-03-28

## 2025-01-27 ASSESSMENT — ENCOUNTER SYMPTOMS
OCCASIONAL FEELINGS OF UNSTEADINESS: 0
DEPRESSION: 0
LOSS OF SENSATION IN FEET: 0

## 2025-01-27 ASSESSMENT — PAIN SCALES - GENERAL: PAINLEVEL_OUTOF10: 0-NO PAIN

## 2025-01-27 ASSESSMENT — COLUMBIA-SUICIDE SEVERITY RATING SCALE - C-SSRS
1. IN THE PAST MONTH, HAVE YOU WISHED YOU WERE DEAD OR WISHED YOU COULD GO TO SLEEP AND NOT WAKE UP?: NO
2. HAVE YOU ACTUALLY HAD ANY THOUGHTS OF KILLING YOURSELF?: NO
6. HAVE YOU EVER DONE ANYTHING, STARTED TO DO ANYTHING, OR PREPARED TO DO ANYTHING TO END YOUR LIFE?: NO

## 2025-01-27 NOTE — PROGRESS NOTES
"Subjective   Patient ID: Jamil Negron is a 29 y.o. female who presents for Annual Exam (Annual Exam- No Pap).    HPI On compound weight loss medication 5 months , having good results losing weight on 10mg weekly, increase exercise efrem, dizziness 1-2 times weekly in the morning , admits to not drinking enough water, no nausea or vomiting sstates she is eatting dinner at 7pm then not eatting again until 12 noon. Likely low blood sugar and dehydration contributing to dizziness     Review of Systems    Objective   /68 (BP Location: Left arm, Patient Position: Sitting, BP Cuff Size: Adult)   Pulse 91   Temp 36.6 °C (97.8 °F) (Temporal)   Ht 1.575 m (5' 2\")   Wt 89.1 kg (196 lb 6.4 oz)   SpO2 98%   BMI 35.92 kg/m²     Physical Exam  Constitutional:       General: She is not in acute distress.     Appearance: Normal appearance.   Cardiovascular:      Rate and Rhythm: Normal rate and regular rhythm.      Heart sounds: No murmur heard.  Pulmonary:      Breath sounds: Normal breath sounds. No wheezing.   Neurological:      Mental Status: She is alert.         Assessment/Plan   Problem List Items Addressed This Visit             ICD-10-CM    Anxiety F41.9    Relevant Medications    FLUoxetine (PROzac) 20 mg capsule     Other Visit Diagnoses         Codes    Nausea    -  Primary R11.0    Relevant Medications    ondansetron ODT (Zofran-ODT) 8 mg disintegrating tablet    Flexural eczema     L20.82    Relevant Medications    triamcinolone (Kenalog) 0.1 % ointment    Dizziness     R42    Other iron deficiency anemia     D50.8    Relevant Medications    ergocalciferol (Vitamin D-2) 1.25 MG (53049 UT) capsule    Other Relevant Orders    Vitamin B12    Iron and TIBC    PCOS (polycystic ovarian syndrome)     E28.2    PMDD (premenstrual dysphoric disorder)     F32.81    Relevant Medications    FLUoxetine (PROzac) 20 mg capsule    Routine general medical examination at a health care facility     Z00.00    Routine medical " exam     Z00.00    Relevant Orders    Lipid Panel    TSH with reflex to Free T4 if abnormal    CBC and Auto Differential    Comprehensive Metabolic Panel    Hyperlipidemia, unspecified hyperlipidemia type     E78.5    Relevant Orders    TSH with reflex to Free T4 if abnormal    CBC and Auto Differential    Comprehensive Metabolic Panel    Primary hypertension     I10    Relevant Orders    TSH with reflex to Free T4 if abnormal    CBC and Auto Differential    Comprehensive Metabolic Panel    Vitamin D deficiency     E55.9    Relevant Orders    TSH with reflex to Free T4 if abnormal

## 2025-01-28 LAB
ALBUMIN SERPL-MCNC: 4.3 G/DL (ref 3.6–5.1)
ALP SERPL-CCNC: 62 U/L (ref 31–125)
ALT SERPL-CCNC: 13 U/L (ref 6–29)
ANION GAP SERPL CALCULATED.4IONS-SCNC: 11 MMOL/L (CALC) (ref 7–17)
AST SERPL-CCNC: 14 U/L (ref 10–30)
BASOPHILS # BLD AUTO: 94 CELLS/UL (ref 0–200)
BASOPHILS NFR BLD AUTO: 0.9 %
BILIRUB SERPL-MCNC: 0.3 MG/DL (ref 0.2–1.2)
BUN SERPL-MCNC: 10 MG/DL (ref 7–25)
CALCIUM SERPL-MCNC: 8.9 MG/DL (ref 8.6–10.2)
CHLORIDE SERPL-SCNC: 105 MMOL/L (ref 98–110)
CHOLEST SERPL-MCNC: 178 MG/DL
CHOLEST/HDLC SERPL: 4.6 (CALC)
CO2 SERPL-SCNC: 22 MMOL/L (ref 20–32)
CREAT SERPL-MCNC: 0.65 MG/DL (ref 0.5–0.97)
EGFRCR SERPLBLD CKD-EPI 2021: 121 ML/MIN/1.73M2
EOSINOPHIL # BLD AUTO: 302 CELLS/UL (ref 15–500)
EOSINOPHIL NFR BLD AUTO: 2.9 %
ERYTHROCYTE [DISTWIDTH] IN BLOOD BY AUTOMATED COUNT: 13.1 % (ref 11–15)
GLUCOSE SERPL-MCNC: 77 MG/DL (ref 65–99)
HCT VFR BLD AUTO: 44.9 % (ref 35–45)
HDLC SERPL-MCNC: 39 MG/DL
HGB BLD-MCNC: 14.5 G/DL (ref 11.7–15.5)
IRON SATN MFR SERPL: 12 % (CALC) (ref 16–45)
IRON SERPL-MCNC: 37 MCG/DL (ref 40–190)
LDLC SERPL CALC-MCNC: 121 MG/DL (CALC)
LYMPHOCYTES # BLD AUTO: 3120 CELLS/UL (ref 850–3900)
LYMPHOCYTES NFR BLD AUTO: 30 %
MCH RBC QN AUTO: 29.2 PG (ref 27–33)
MCHC RBC AUTO-ENTMCNC: 32.3 G/DL (ref 32–36)
MCV RBC AUTO: 90.5 FL (ref 80–100)
MONOCYTES # BLD AUTO: 551 CELLS/UL (ref 200–950)
MONOCYTES NFR BLD AUTO: 5.3 %
NEUTROPHILS # BLD AUTO: 6334 CELLS/UL (ref 1500–7800)
NEUTROPHILS NFR BLD AUTO: 60.9 %
NONHDLC SERPL-MCNC: 139 MG/DL (CALC)
PLATELET # BLD AUTO: 371 THOUSAND/UL (ref 140–400)
PMV BLD REES-ECKER: 11.2 FL (ref 7.5–12.5)
POTASSIUM SERPL-SCNC: 4.4 MMOL/L (ref 3.5–5.3)
PROT SERPL-MCNC: 7.1 G/DL (ref 6.1–8.1)
RBC # BLD AUTO: 4.96 MILLION/UL (ref 3.8–5.1)
SODIUM SERPL-SCNC: 138 MMOL/L (ref 135–146)
TIBC SERPL-MCNC: 305 MCG/DL (CALC) (ref 250–450)
TRIGL SERPL-MCNC: 84 MG/DL
TSH SERPL-ACNC: 0.8 MIU/L
VIT B12 SERPL-MCNC: 480 PG/ML (ref 200–1100)
WBC # BLD AUTO: 10.4 THOUSAND/UL (ref 3.8–10.8)

## 2025-03-31 ENCOUNTER — PATIENT MESSAGE (OUTPATIENT)
Dept: PRIMARY CARE | Facility: CLINIC | Age: 30
End: 2025-03-31
Payer: COMMERCIAL

## 2025-03-31 DIAGNOSIS — F41.9 ANXIETY: ICD-10-CM

## 2025-03-31 DIAGNOSIS — F32.81 PMDD (PREMENSTRUAL DYSPHORIC DISORDER): ICD-10-CM

## 2025-03-31 DIAGNOSIS — K21.00 GASTROESOPHAGEAL REFLUX DISEASE WITH ESOPHAGITIS WITHOUT HEMORRHAGE: ICD-10-CM

## 2025-03-31 RX ORDER — OMEPRAZOLE 40 MG/1
CAPSULE, DELAYED RELEASE ORAL
Qty: 30 CAPSULE | Refills: 6 | OUTPATIENT
Start: 2025-03-31

## 2025-03-31 RX ORDER — FLUOXETINE HYDROCHLORIDE 20 MG/1
20 CAPSULE ORAL DAILY
Qty: 30 CAPSULE | Refills: 1 | Status: SHIPPED
Start: 2025-03-31 | End: 2025-03-31

## 2025-03-31 RX ORDER — OMEPRAZOLE 40 MG/1
40 CAPSULE, DELAYED RELEASE ORAL
Qty: 90 CAPSULE | Refills: 3 | Status: SHIPPED | OUTPATIENT
Start: 2025-03-31 | End: 2026-03-31

## 2025-03-31 RX ORDER — FLUOXETINE HYDROCHLORIDE 20 MG/1
20 CAPSULE ORAL DAILY
Qty: 30 CAPSULE | Refills: 1 | OUTPATIENT
Start: 2025-03-31

## 2025-03-31 RX ORDER — FLUOXETINE HYDROCHLORIDE 20 MG/1
20 CAPSULE ORAL DAILY
Qty: 90 CAPSULE | Refills: 3 | Status: SHIPPED | OUTPATIENT
Start: 2025-03-31 | End: 2026-03-31

## 2025-04-22 DIAGNOSIS — T75.3XXA MOTION SICKNESS, INITIAL ENCOUNTER: Primary | ICD-10-CM

## 2025-04-22 RX ORDER — SCOPOLAMINE 1 MG/3D
1 PATCH, EXTENDED RELEASE TRANSDERMAL
Qty: 10 PATCH | Refills: 0 | Status: SHIPPED | OUTPATIENT
Start: 2025-04-22 | End: 2025-06-21

## 2025-04-29 ENCOUNTER — PATIENT MESSAGE (OUTPATIENT)
Dept: PRIMARY CARE | Facility: CLINIC | Age: 30
End: 2025-04-29
Payer: COMMERCIAL

## 2025-04-29 DIAGNOSIS — Z72.0 TOBACCO USE: ICD-10-CM

## 2025-04-29 DIAGNOSIS — K21.00 GASTROESOPHAGEAL REFLUX DISEASE WITH ESOPHAGITIS WITHOUT HEMORRHAGE: ICD-10-CM

## 2025-04-29 RX ORDER — OMEPRAZOLE 40 MG/1
40 CAPSULE, DELAYED RELEASE ORAL
Qty: 90 CAPSULE | Refills: 3 | Status: SHIPPED | OUTPATIENT
Start: 2025-04-29 | End: 2026-04-29

## 2025-04-29 RX ORDER — BUPROPION HYDROCHLORIDE 150 MG/1
150 TABLET ORAL EVERY MORNING
Qty: 90 TABLET | Refills: 3 | Status: SHIPPED | OUTPATIENT
Start: 2025-04-29 | End: 2026-04-29

## 2025-04-29 RX ORDER — BUPROPION HYDROCHLORIDE 150 MG/1
TABLET ORAL
Qty: 30 TABLET | Refills: 4 | OUTPATIENT
Start: 2025-04-29

## 2025-04-30 ENCOUNTER — OFFICE VISIT (OUTPATIENT)
Facility: CLINIC | Age: 30
End: 2025-04-30
Payer: COMMERCIAL

## 2025-04-30 VITALS
HEIGHT: 62 IN | SYSTOLIC BLOOD PRESSURE: 120 MMHG | WEIGHT: 183.1 LBS | DIASTOLIC BLOOD PRESSURE: 82 MMHG | BODY MASS INDEX: 33.69 KG/M2

## 2025-04-30 DIAGNOSIS — L73.2 HIDRADENITIS SUPPURATIVA: ICD-10-CM

## 2025-04-30 DIAGNOSIS — Z11.3 SCREENING FOR STDS (SEXUALLY TRANSMITTED DISEASES): ICD-10-CM

## 2025-04-30 DIAGNOSIS — N92.1 MENORRHAGIA WITH IRREGULAR CYCLE: ICD-10-CM

## 2025-04-30 DIAGNOSIS — Z01.419 WELL WOMAN EXAM WITH ROUTINE GYNECOLOGICAL EXAM: Primary | ICD-10-CM

## 2025-04-30 PROCEDURE — 3008F BODY MASS INDEX DOCD: CPT | Performed by: OBSTETRICS & GYNECOLOGY

## 2025-04-30 PROCEDURE — 99395 PREV VISIT EST AGE 18-39: CPT | Performed by: OBSTETRICS & GYNECOLOGY

## 2025-04-30 ASSESSMENT — ENCOUNTER SYMPTOMS
LOSS OF SENSATION IN FEET: 0
OCCASIONAL FEELINGS OF UNSTEADINESS: 0
DEPRESSION: 0

## 2025-04-30 ASSESSMENT — PAIN SCALES - GENERAL: PAINLEVEL_OUTOF10: 0-NO PAIN

## 2025-04-30 NOTE — PROGRESS NOTES
"Subjective     History of Present Illness  Magad Negron \"Jamil\" is a 29 year old female who presents with irregular menstrual bleeding for well woman exam      She had a Nexplanon inserted in 2023 and initially experienced amenorrhea for the first eight to nine months. Recently, she has had irregular bleeding, including two menstrual cycles in one month with a two-week interval. Her menses are characterized by a heavy second day, necessitating tampon changes every three hours, and last four to five days.    She experiences deep dyspareunia occasionally, which is not associated with the use of condoms or new lubricants. She has the same sexual partner and has a history of chlamydia, which was treated successfully with a negative repeat test.    She has a history of menorrhagia and dysmenorrhea since menarche at age [age]. She was on Depo-Provera for seven years, which resulted in amenorrhea, and transitioned directly to Nexplanon. Her menstrual symptoms have not significantly improved over time.    She has a history of hidradenitis suppurativa, which has shown improvement since starting Nexplanon, with no recent breakouts but some residual lesions.    She has experienced significant weight loss of 65 pounds attributed to the use of Zepbound. She has a history of hypercholesterolemia and iron deficiency, for which she is taking iron supplements. Her thyroid function has been previously evaluated and is within normal limits.  Last pap:  :NEG.  Last mammogram  Current contraception: nexplanon  History of abnormal Pap smear: no  Family history of uterine or ovarian cancer: no  Regular self breast exam: yes  History of abnormal mammogram: no  Family history of breast cancer: no  History of abnormal lipids: no  Menstrual History:  OB History          0    Para   0    Term   0       0    AB   0    Living   0         SAB   0    IAB   0    Ectopic   0    Multiple   0    Live Births   0        "           No LMP recorded (lmp unknown). Patient has had an implant.         Medical History[1]    Surgical History[2]    Social History     Socioeconomic History    Marital status: Single     Spouse name: Not on file    Number of children: Not on file    Years of education: Not on file    Highest education level: Not on file   Occupational History    Not on file   Tobacco Use    Smoking status: Former     Current packs/day: 1.00     Average packs/day: 1 pack/day for 10.0 years (10.0 ttl pk-yrs)     Types: Cigarettes     Passive exposure: Current    Smokeless tobacco: Never   Vaping Use    Vaping status: Never Used   Substance and Sexual Activity    Alcohol use: Yes     Comment: Not even one drink per week    Drug use: Never    Sexual activity: Yes     Partners: Male     Birth control/protection: Implant     Comment: Depo   Other Topics Concern    Not on file   Social History Narrative    Not on file     Social Drivers of Health     Financial Resource Strain: Not on file   Food Insecurity: Not on file   Transportation Needs: Not on file   Physical Activity: Not on file   Stress: Not on file   Social Connections: Not on file   Intimate Partner Violence: Not on file   Housing Stability: Not on file       Family History[3]    Prior to Admission medications    Medication Sig Start Date End Date Taking? Authorizing Provider   albuterol (Ventolin HFA) 90 mcg/actuation inhaler Inhale 2 puffs every 4 hours if needed for wheezing or shortness of breath. 8/26/24 8/26/25  DUSTIN Dueñas   albuterol 2.5 mg /3 mL (0.083 %) nebulizer solution Take 3 mL by nebulization every 8 hours if needed. 3/15/16   Historical Provider, MD   albuterol 90 mcg/actuation inhaler Inhale 2 puffs every 4 hours if needed for wheezing. 8/26/24   KEYSHAWN Dueñas-CNP   ALPRAZolam (Xanax) 0.25 mg tablet Take 1 tablet (0.25 mg) by mouth once daily.  Patient taking differently: Take 1 tablet (0.25 mg) by mouth if needed for anxiety.  2/16/23   Historical Provider, MD   budesonide (Pulmicort) 0.25 mg/2 mL nebulizer solution Take 2 mL (0.25 mg) by nebulization once daily. Rinse mouth with water after use to reduce aftertaste and incidence of candidiasis. Do not swallow. 6/17/24 6/17/25  DUSTIN Dueñas   buPROPion XL (Wellbutrin XL) 150 mg 24 hr tablet Take 1 tablet (150 mg) by mouth once daily in the morning. Do not crush, chew, or split. 4/29/25 4/29/26  DUSTIN Dueñas   EPINEPHrine (EpiPen) 0.3 mg/0.3 mL injection syringe as directed Injection as directed    Historical Provider, MD   FLUoxetine (PROzac) 20 mg capsule Take 1 capsule (20 mg) by mouth once daily. 3/31/25 3/31/26  DUSTIN Dueñas   methylPREDNISolone (Medrol Dospak) 4 mg tablets Take as directed on package. 4/5/24   DUSTIN Dueñas   montelukast (Singulair) 10 mg tablet TAKE ONE TABLET BY MOUTH EVERY DAY 8/12/24   DUSTIN Dueñas   Nexplanon 68 mg implant implant 1 each by subdermal route 1 time. 8/21/23   Historical Provider, MD   omeprazole (PriLOSEC) 40 mg DR capsule Take 1 capsule (40 mg) by mouth once daily in the morning. Take before meals. Do not crush or chew. 4/29/25 4/29/26  DUSTIN Dueñas   scopolamine (Transderm-Scop) 1 mg over 3 days patch 3 day Place 1 patch over 72 hours on the skin every 3rd day if needed (nausea). 4/22/25 6/21/25  DUSTIN Dueñas   tirzepatide 10 mg/0.5 mL pen injector Inject 10 mg under the skin every 7 days.    Historical Provider, MD   triamcinolone (Kenalog) 0.1 % ointment Apply topically 2 times a day as needed for irritation or rash. 1/27/25 5/27/25  DUSTIN Dueñas   valACYclovir (Valtrex) 1 gram tablet TAKE ONE-HALF TABLET BY MOUTH EVERY DAY 12/30/24   Eden Ashby, APRN-CNP   buPROPion XL (Wellbutrin XL) 150 mg 24 hr tablet TAKE ONE TABLET BY MOUTH EVERY DAY IN THE MORNING, DO NOT CRUSH, CHEW OR SPLIT 12/2/24 4/29/25  Gabrielle Aceves, APRN-CNP  "  omeprazole (PriLOSEC) 40 mg DR capsule Take 1 capsule (40 mg) by mouth once daily in the morning. Take before meals. Do not crush or chew. 3/31/25 4/29/25  KEYSHAWN Dueñas-CNP       Allergies[4]           Objective   /82   Ht 1.575 m (5' 2\")   Wt 83.1 kg (183 lb 1.6 oz)   LMP  (LMP Unknown)   BMI 33.49 kg/m²     Physical Exam      Assessment & Plan  Irregular menstrual bleeding  Experiencing irregular menstrual bleeding with periods occurring twice in the past month, approximately two weeks apart. Initially had amenorrhea for 8-9 months post-Nexplanon insertion, followed by irregular bleeding. Possible causes include residual effects of Depo-Provera and adjustment to Nexplanon. Nexplanon remains a highly reliable contraceptive with 99.9% efficacy.  - Order pelvic ultrasound  - Perform cultures to rule out infection  - Monitor menstrual pattern over the next 6 months  - Review ultrasound results post-Mother's Day    Woman's Wellness Exam  Routine well woman exam conducted. Pap smear not required as last Pap was negative in October 2023. Clinical breast exam and pelvic exam performed with no abnormalities detected.  - Schedule Pap smear for next year    Hidradenitis suppurativa  Hidradenitis suppurativa showing improvement since last visit with current lesions less severe. Possible improvement linked to Nexplanon use.    Assessment    Problem List Items Addressed This Visit       Screening for STDs (sexually transmitted diseases)    Relevant Orders    C. trachomatis / N. gonorrhoeae, Amplified, Urogenital (Completed)    Menorrhagia with irregular cycle    Relevant Orders    US PELVIS TRANSABDOMINAL WITH TRANSVAGINAL    Hidradenitis suppurativa     Other Visit Diagnoses         Well woman exam with routine gynecological exam    -  Primary             Follow up in about 2 weeks (around 5/14/2025).   All questions answered.  Breast self exam technique reviewed and patient encouraged to perform " self-exam monthly.  Chlamydia specimen.  Contraception: none.  Diagnosis explained in detail, including differential.  Discussed healthy lifestyle modifications.  GC specimen..    This medical note was created with the assistance of artificial intelligence (AI) for documentation purposes. The content has been reviewed and confirmed by the healthcare provider for accuracy and completeness. Patient consented to the use of audio recording and use of AI during their visit.          [1]   Past Medical History:  Diagnosis Date    Allergy to peanuts     History of peanut allergy    Allergy to seafood     History of allergy to seafood    Asthma     Chlamydia     GERD (gastroesophageal reflux disease)     Headache     Kidney stone     Obesity    [2]   Past Surgical History:  Procedure Laterality Date    KIDNEY STONE SURGERY  2021    OTHER SURGICAL HISTORY  07/16/2018    Treatment Of Elbow Fracture    WISDOM TOOTH EXTRACTION  2022   [3]   Family History  Problem Relation Name Age of Onset    Hyperlipidemia Father Jeff boyd     Anxiety disorder Father Jeff boyd     Hypertension Father Jeff oliver     Asthma Maternal Grandmother Risa boyd     Diabetes Maternal Grandmother Risa boyd    [4]   Allergies  Allergen Reactions    Peanut Anaphylaxis    Penicillin Anaphylaxis and Hives    Penicillins Hives, Rash and Nausea/vomiting    Amoxicillin Unknown     SEVERITY UNKNOWN    Shellfish Derived Unknown     SEVERITY UNKNOWN

## 2025-05-01 LAB
C TRACH RRNA SPEC QL NAA+PROBE: NOT DETECTED
N GONORRHOEA RRNA SPEC QL NAA+PROBE: NOT DETECTED
QUEST GC CT AMPLIFIED (ALWAYS MESSAGE): NORMAL

## 2025-05-03 PROBLEM — L73.2 HIDRADENITIS SUPPURATIVA: Status: ACTIVE | Noted: 2025-05-03

## 2025-05-03 PROBLEM — Z11.3 SCREENING FOR STDS (SEXUALLY TRANSMITTED DISEASES): Status: ACTIVE | Noted: 2025-05-03

## 2025-05-03 PROBLEM — N92.1 MENORRHAGIA WITH IRREGULAR CYCLE: Status: ACTIVE | Noted: 2025-05-03

## 2025-05-12 ENCOUNTER — HOSPITAL ENCOUNTER (OUTPATIENT)
Dept: RADIOLOGY | Facility: CLINIC | Age: 30
Discharge: HOME | End: 2025-05-12
Payer: COMMERCIAL

## 2025-05-12 DIAGNOSIS — N92.1 MENORRHAGIA WITH IRREGULAR CYCLE: ICD-10-CM

## 2025-05-12 PROCEDURE — 76830 TRANSVAGINAL US NON-OB: CPT

## 2025-05-12 PROCEDURE — 76830 TRANSVAGINAL US NON-OB: CPT | Performed by: STUDENT IN AN ORGANIZED HEALTH CARE EDUCATION/TRAINING PROGRAM

## 2025-05-12 PROCEDURE — 76856 US EXAM PELVIC COMPLETE: CPT | Performed by: STUDENT IN AN ORGANIZED HEALTH CARE EDUCATION/TRAINING PROGRAM

## 2025-06-04 DIAGNOSIS — B00.9 HERPES SIMPLEX TYPE 1 INFECTION: ICD-10-CM

## 2025-06-05 RX ORDER — VALACYCLOVIR HYDROCHLORIDE 1 G/1
500 TABLET, FILM COATED ORAL DAILY
Qty: 75 TABLET | Refills: 0 | Status: SHIPPED | OUTPATIENT
Start: 2025-06-05

## 2025-06-24 ENCOUNTER — PREP FOR PROCEDURE (OUTPATIENT)
Facility: CLINIC | Age: 30
End: 2025-06-24

## 2025-06-24 ENCOUNTER — OFFICE VISIT (OUTPATIENT)
Facility: CLINIC | Age: 30
End: 2025-06-24
Payer: COMMERCIAL

## 2025-06-24 VITALS
WEIGHT: 179.2 LBS | SYSTOLIC BLOOD PRESSURE: 111 MMHG | BODY MASS INDEX: 32.97 KG/M2 | HEIGHT: 62 IN | DIASTOLIC BLOOD PRESSURE: 91 MMHG

## 2025-06-24 DIAGNOSIS — Q51.3 BICORNUATE UTERUS, PARTIAL: ICD-10-CM

## 2025-06-24 DIAGNOSIS — Q51.3 BICORNUATE UTERUS: ICD-10-CM

## 2025-06-24 DIAGNOSIS — N92.1 MENORRHAGIA WITH IRREGULAR CYCLE: ICD-10-CM

## 2025-06-24 DIAGNOSIS — N94.10 DYSPAREUNIA IN FEMALE: ICD-10-CM

## 2025-06-24 DIAGNOSIS — N94.6 DYSMENORRHEA: Primary | ICD-10-CM

## 2025-06-24 PROCEDURE — 99214 OFFICE O/P EST MOD 30 MIN: CPT | Performed by: OBSTETRICS & GYNECOLOGY

## 2025-06-24 PROCEDURE — 3008F BODY MASS INDEX DOCD: CPT | Performed by: OBSTETRICS & GYNECOLOGY

## 2025-06-24 RX ORDER — ACETAMINOPHEN 325 MG/1
975 TABLET ORAL ONCE
OUTPATIENT
Start: 2025-06-24 | End: 2025-06-24

## 2025-06-24 RX ORDER — GABAPENTIN 600 MG/1
600 TABLET ORAL ONCE
OUTPATIENT
Start: 2025-06-24 | End: 2025-06-24

## 2025-06-24 ASSESSMENT — ENCOUNTER SYMPTOMS
DEPRESSION: 0
OCCASIONAL FEELINGS OF UNSTEADINESS: 0
LOSS OF SENSATION IN FEET: 0

## 2025-06-24 ASSESSMENT — PAIN SCALES - GENERAL: PAINLEVEL_OUTOF10: 0-NO PAIN

## 2025-06-24 NOTE — PROGRESS NOTES
"GYN OFFICE VISIT    Patient Name:  Magda Negron  :  1995  MR #:  [id number]  Acct #:  [id number]      ASSESSMENT/PLAN:     There are no diagnoses linked to this encounter.     Magda Mcdaniel" was seen today for follow-up.  Diagnoses and all orders for this visit:  Dysmenorrhea (Primary)  Menorrhagia with irregular cycle  Dyspareunia in female  Bicornuate uterus, partial        Assessment & Plan  Irregular Menstrual Bleeding and Dysmenorrhea  She reports having her period three times in the past month with excruciating pain after intercourse. The irregular bleeding and pain suggest a possible hormonal imbalance and the potential presence of endometriosis. The current Nexplanon may not be providing adequate hormonal control. A diagnostic laparoscopy and D&C hysteroscopy are planned to evaluate the uterine cavity and obtain a sample of the lining. The procedure will also allow for the potential fulguration of endometriosis if found. The surgery is scheduled for  or , depending on availability. Enhanced recovery after surgery protocol will be followed to reduce postoperative complications.  - Schedule outpatient surgery for diagnostic laparoscopy and D&C hysteroscopy to evaluate the uterine cavity and obtain a sample of the lining.  - Consider fulguration of endometriosis if found during laparoscopy.  - Perform chromotubation if endometriosis is found to assess tubal patency.  - Schedule surgery for  or , depending on availability.  - Follow enhanced recovery after surgery protocol to reduce postoperative complications.    Dyspareunia  She experiences severe pain after intercourse, lasting several hours. This pain is not attributed to the bicornuate uterus and may be related to endometriosis or other pelvic pathology. A diagnostic laparoscopy and D&C hysteroscopy will investigate potential causes.  - Include dyspareunia in the diagnostic laparoscopy and D&C " "hysteroscopy to investigate potential causes.    Endometriosis (suspected)  Endometriosis is suspected due to her symptoms of severe pain after intercourse and irregular bleeding. Imaging does not confirm endometriosis, but a diagnostic laparoscopy is planned to investigate further. If endometriosis is found, fulguration may be performed to reduce symptoms. The procedure will be done under general anesthesia, and she will be pretreated for pain to minimize narcotic use postoperatively. She should plan to be off work for a minimum of five days, with a two-week follow-up appointment scheduled to assess recovery and discuss findings.  - Perform diagnostic laparoscopy to look for endometriosis and consider fulguration if found.  - Plan for a minimum of five days off work post-surgery, with a two-week follow-up appointment to assess recovery and discuss findings.    Bicornuate Uterus  The ultrasound revealed a bicornuate uterus, a congenital uterine anomaly where the uterus is heart-shaped. This condition is present from birth and is not the cause of her pain. It may affect pregnancy outcomes, such as making it more challenging for the baby to flip to a head-down position. Surgical intervention for uterine reconstruction is not recommended at this time.    Polycystic Ovarian Syndrome (PCOS)  The ultrasound showed a polycystic appearance of the ovaries, consistent with her history and symptoms suggestive of PCOS. The presence of functional cysts is noted, but they appear benign and are not concerning at this time.         .All questions answered.  Diagnosis explained in detail, including differential.  Discussed healthy lifestyle modifications.    No follow-ups on file.      Subjective    Chief Complaint   Patient presents with    Follow-up         History of Present Illness  Magda Negron \"Negron\" is a 29 year old female who presents with pelvic pain, irregular periods, and pain during intercourse.    She " experiences severe pelvic pain, particularly after intercourse, which can last for several hours and causes significant discomfort. The pain is so intense that it radiates to her umbilical region. She has had her period three times in the past month, which she describes as irregular and associated with significant pain. The pain occurs after intercourse rather than during.    She has a history of using Depo-Provera for six years and has been on Nexplanon since October 2023. An ultrasound revealed a septated uterus. She also reports having cysts on her ovaries, which are described as functional.    She has a history of hidradenitis suppurativa, which intermittently causes her trouble, and a past history of kidney stones, which she initially thought might be related to her current pain.    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 She is uncertain about her future plans regarding having children.       Medical History[1]    Surgical History[2]    Social History     Socioeconomic History    Marital status: Single     Spouse name: Not on file    Number of children: Not on file    Years of education: Not on file    Highest education level: Not on file   Occupational History    Not on file   Tobacco Use    Smoking status: Former     Current packs/day: 1.00     Average packs/day: 1 pack/day for 10.0 years (10.0 ttl pk-yrs)     Types: Cigarettes     Passive exposure: Current    Smokeless tobacco: Never   Vaping Use    Vaping status: Never Used   Substance and Sexual Activity    Alcohol use: Yes     Comment: Not even one drink per week    Drug use: Never    Sexual activity: Yes     Partners: Male     Birth control/protection: Implant     Comment: Depo   Other Topics Concern    Not on file   Social History Narrative    Not on file     Social Drivers of Health     Financial Resource Strain: Not on file   Food Insecurity: Not on  file   Transportation Needs: Not on file   Physical Activity: Not on file   Stress: Not on file   Social Connections: Not on file   Intimate Partner Violence: Not on file   Housing Stability: Not on file       Family History[3]    Prior to Admission medications    Medication Sig Start Date End Date Taking? Authorizing Provider   albuterol (Ventolin HFA) 90 mcg/actuation inhaler Inhale 2 puffs every 4 hours if needed for wheezing or shortness of breath. 8/26/24 8/26/25  DUSTIN Dueñas   albuterol 2.5 mg /3 mL (0.083 %) nebulizer solution Take 3 mL by nebulization every 8 hours if needed. 3/15/16   Historical Provider, MD   albuterol 90 mcg/actuation inhaler Inhale 2 puffs every 4 hours if needed for wheezing. 8/26/24   DUSTIN Dueñas   ALPRAZolam (Xanax) 0.25 mg tablet Take 1 tablet (0.25 mg) by mouth once daily.  Patient taking differently: Take 1 tablet (0.25 mg) by mouth if needed for anxiety. 2/16/23   Historical Provider, MD   budesonide (Pulmicort) 0.25 mg/2 mL nebulizer solution Take 2 mL (0.25 mg) by nebulization once daily. Rinse mouth with water after use to reduce aftertaste and incidence of candidiasis. Do not swallow. 6/17/24 6/17/25  DUSTIN Dueñas   buPROPion XL (Wellbutrin XL) 150 mg 24 hr tablet Take 1 tablet (150 mg) by mouth once daily in the morning. Do not crush, chew, or split. 4/29/25 4/29/26  DUSTIN Dueñas   EPINEPHrine (EpiPen) 0.3 mg/0.3 mL injection syringe as directed Injection as directed    Historical Provider, MD   FLUoxetine (PROzac) 20 mg capsule Take 1 capsule (20 mg) by mouth once daily. 3/31/25 3/31/26  DUSTIN Dueñas   methylPREDNISolone (Medrol Dospak) 4 mg tablets Take as directed on package. 4/5/24   DUSTIN Dueñas   montelukast (Singulair) 10 mg tablet TAKE ONE TABLET BY MOUTH EVERY DAY 8/12/24   Gabrielle M Ketan, APRN-CNP   Nexplanon 68 mg implant implant 1 each by subdermal route 1 time. 8/21/23    "Historical Provider, MD   omeprazole (PriLOSEC) 40 mg DR capsule Take 1 capsule (40 mg) by mouth once daily in the morning. Take before meals. Do not crush or chew. 4/29/25 4/29/26  DSUTIN Dueñas   scopolamine (Transderm-Scop) 1 mg over 3 days patch 3 day Place 1 patch over 72 hours on the skin every 3rd day if needed (nausea). 4/22/25 6/21/25  DUSTIN Dueñas   tirzepatide 10 mg/0.5 mL pen injector Inject 10 mg under the skin every 7 days.    Historical Provider, MD   valACYclovir (Valtrex) 1 gram tablet TAKE ONE-HALF TABLET BY MOUTH EVERY DAY 6/5/25   DUSTIN Alejandro       Allergies[4]           OBJECTIVE:   BP (!) 111/91   Ht 1.575 m (5' 2\")   Wt 81.3 kg (179 lb 3.2 oz)   LMP  (LMP Unknown)   BMI 32.78 kg/m²   Body mass index is 32.78 kg/m².     Physical Exam  Constitutional:       General: She is not in acute distress.     Appearance: Normal appearance.   Cardiovascular:      Rate and Rhythm: Normal rate and regular rhythm.      Heart sounds: Normal heart sounds.   Pulmonary:      Effort: Pulmonary effort is normal.      Breath sounds: Normal breath sounds. No wheezing or rhonchi.   Abdominal:      General: Bowel sounds are normal. There is no distension.      Tenderness: There is no abdominal tenderness. There is no guarding.   Neurological:      Mental Status: She is alert.         Results  RADIOLOGY  Pelvic ultrasound: Bicornuate uterus with septum, normal-appearing ovarian cysts, no polyps or fibroids, no abnormal blood vessels     Labs reviewed:    Office Visit on 04/30/2025   Component Date Value Ref Range Status    CHLAMYDIA TRACHOMATIS RNA, TMA, UR* 04/30/2025 NOT DETECTED  NOT DETECTED Final    NEISSERIA GONORRHOEAE RNA, TMA, UR* 04/30/2025 NOT DETECTED  NOT DETECTED Final    (Always Message) 04/30/2025    Final    Comment: The analytical performance characteristics of this  assay, when used to test SurePath(TM) specimens have been  determined by [facility] [facility]. The modifications have  not been cleared or approved by the FDA. This assay has  been validated pursuant to the CLIA regulations and is  used for clinical purposes.     For additional information, please refer to  [email or web address]/[email or web address]  (This link is being provided for information/  educational purposes only.)              Note: This dictation was generated using Dragon voice recognition software. Please excuse any grammatical or spelling errors that may have occurred using the system.    This medical note was created with the assistance of artificial intelligence (AI) for documentation purposes. The content has been reviewed and confirmed by the healthcare provider for accuracy and completeness. Patient consented to the use of audio recording and use of AI during their visit.             [1]   Past Medical History:  Diagnosis Date    Allergy to peanuts     History of peanut allergy    Allergy to seafood     History of allergy to seafood    Asthma     Chlamydia     GERD (gastroesophageal reflux disease)     Headache     Kidney stone     Obesity    [2]   Past Surgical History:  Procedure Laterality Date    KIDNEY STONE SURGERY  2021    OTHER SURGICAL HISTORY  07/16/2018    Treatment Of Elbow Fracture    WISDOM TOOTH EXTRACTION  2022   [3]   Family History  Problem Relation Name Age of Onset    Hyperlipidemia Father Jeff boyd     Anxiety disorder Father Jeff boyd     Hypertension Father Jeff boyd     Asthma Maternal Grandmother Risa boyd     Diabetes Maternal Grandmother Risa boyd    [4]   Allergies  Allergen Reactions    Peanut Anaphylaxis    Penicillin Anaphylaxis and Hives    Penicillins Hives, Rash and Nausea/vomiting    Amoxicillin Unknown     SEVERITY UNKNOWN    Shellfish Derived Unknown     SEVERITY UNKNOWN

## 2025-07-02 ENCOUNTER — PATIENT MESSAGE (OUTPATIENT)
Dept: PRIMARY CARE | Facility: CLINIC | Age: 30
End: 2025-07-02
Payer: COMMERCIAL

## 2025-07-02 DIAGNOSIS — R11.0 NAUSEA: Primary | ICD-10-CM

## 2025-07-02 DIAGNOSIS — R11.0 NAUSEA: ICD-10-CM

## 2025-07-02 RX ORDER — ONDANSETRON 8 MG/1
8 TABLET, ORALLY DISINTEGRATING ORAL EVERY 8 HOURS PRN
Qty: 20 TABLET | Refills: 0 | Status: SHIPPED | OUTPATIENT
Start: 2025-07-02 | End: 2025-07-09

## 2025-07-03 RX ORDER — ONDANSETRON 8 MG/1
8 TABLET, ORALLY DISINTEGRATING ORAL EVERY 8 HOURS PRN
Qty: 20 TABLET | Refills: 1 | Status: SHIPPED | OUTPATIENT
Start: 2025-07-03

## 2025-07-18 ENCOUNTER — PRE-ADMISSION TESTING (OUTPATIENT)
Dept: PREADMISSION TESTING | Facility: HOSPITAL | Age: 30
End: 2025-07-18
Payer: COMMERCIAL

## 2025-07-18 VITALS
HEIGHT: 62 IN | RESPIRATION RATE: 16 BRPM | WEIGHT: 178 LBS | BODY MASS INDEX: 32.76 KG/M2 | TEMPERATURE: 97 F | SYSTOLIC BLOOD PRESSURE: 114 MMHG | HEART RATE: 86 BPM | DIASTOLIC BLOOD PRESSURE: 73 MMHG | OXYGEN SATURATION: 100 %

## 2025-07-18 DIAGNOSIS — Z01.818 PREOP TESTING: Primary | ICD-10-CM

## 2025-07-18 LAB
ABO GROUP (TYPE) IN BLOOD: NORMAL
ANION GAP SERPL CALCULATED.3IONS-SCNC: 11 MMOL/L (ref 10–20)
ANTIBODY SCREEN: NORMAL
BASOPHILS # BLD AUTO: 0.1 X10*3/UL (ref 0–0.1)
BASOPHILS NFR BLD AUTO: 1.2 %
BUN SERPL-MCNC: 13 MG/DL (ref 6–23)
CALCIUM SERPL-MCNC: 8.9 MG/DL (ref 8.6–10.3)
CHLORIDE SERPL-SCNC: 107 MMOL/L (ref 98–107)
CO2 SERPL-SCNC: 23 MMOL/L (ref 21–32)
CREAT SERPL-MCNC: 0.74 MG/DL (ref 0.5–1.05)
EGFRCR SERPLBLD CKD-EPI 2021: >90 ML/MIN/1.73M*2
EOSINOPHIL # BLD AUTO: 0.17 X10*3/UL (ref 0–0.7)
EOSINOPHIL NFR BLD AUTO: 2 %
ERYTHROCYTE [DISTWIDTH] IN BLOOD BY AUTOMATED COUNT: 12.5 % (ref 11.5–14.5)
GLUCOSE SERPL-MCNC: 81 MG/DL (ref 74–99)
HCT VFR BLD AUTO: 41.2 % (ref 36–46)
HGB BLD-MCNC: 13.7 G/DL (ref 12–16)
IMM GRANULOCYTES # BLD AUTO: 0.04 X10*3/UL (ref 0–0.7)
IMM GRANULOCYTES NFR BLD AUTO: 0.5 % (ref 0–0.9)
LYMPHOCYTES # BLD AUTO: 2.64 X10*3/UL (ref 1.2–4.8)
LYMPHOCYTES NFR BLD AUTO: 30.7 %
MCH RBC QN AUTO: 29.5 PG (ref 26–34)
MCHC RBC AUTO-ENTMCNC: 33.3 G/DL (ref 32–36)
MCV RBC AUTO: 89 FL (ref 80–100)
MONOCYTES # BLD AUTO: 0.54 X10*3/UL (ref 0.1–1)
MONOCYTES NFR BLD AUTO: 6.3 %
NEUTROPHILS # BLD AUTO: 5.11 X10*3/UL (ref 1.2–7.7)
NEUTROPHILS NFR BLD AUTO: 59.3 %
NRBC BLD-RTO: 0 /100 WBCS (ref 0–0)
PLATELET # BLD AUTO: 308 X10*3/UL (ref 150–450)
POTASSIUM SERPL-SCNC: 3.7 MMOL/L (ref 3.5–5.3)
RBC # BLD AUTO: 4.65 X10*6/UL (ref 4–5.2)
RH FACTOR (ANTIGEN D): NORMAL
SODIUM SERPL-SCNC: 137 MMOL/L (ref 136–145)
WBC # BLD AUTO: 8.6 X10*3/UL (ref 4.4–11.3)

## 2025-07-18 PROCEDURE — 86850 RBC ANTIBODY SCREEN: CPT

## 2025-07-18 PROCEDURE — 99203 OFFICE O/P NEW LOW 30 MIN: CPT | Performed by: NURSE PRACTITIONER

## 2025-07-18 PROCEDURE — 85025 COMPLETE CBC W/AUTO DIFF WBC: CPT

## 2025-07-18 PROCEDURE — 36415 COLL VENOUS BLD VENIPUNCTURE: CPT

## 2025-07-18 PROCEDURE — 87081 CULTURE SCREEN ONLY: CPT | Mod: TRILAB

## 2025-07-18 PROCEDURE — 80048 BASIC METABOLIC PNL TOTAL CA: CPT

## 2025-07-18 RX ORDER — FERROUS SULFATE 325(65) MG
325 TABLET ORAL
COMMUNITY

## 2025-07-18 RX ORDER — CHLORHEXIDINE GLUCONATE ORAL RINSE 1.2 MG/ML
SOLUTION DENTAL
Qty: 473 ML | Refills: 0 | Status: SHIPPED | OUTPATIENT
Start: 2025-07-18

## 2025-07-18 RX ORDER — ACETAMINOPHEN 500 MG
500 TABLET ORAL EVERY 6 HOURS PRN
COMMUNITY

## 2025-07-18 RX ORDER — CHLORHEXIDINE GLUCONATE 40 MG/ML
SOLUTION TOPICAL
Qty: 473 ML | Refills: 0 | Status: SHIPPED | OUTPATIENT
Start: 2025-07-18

## 2025-07-18 ASSESSMENT — DUKE ACTIVITY SCORE INDEX (DASI)
CAN YOU PARTICIPATE IN STRENOUS SPORTS LIKE SWIMMING, SINGLES TENNIS, FOOTBALL, BASKETBALL, OR SKIING: YES
CAN YOU DO MODERATE WORK AROUND THE HOUSE LIKE VACUUMING, SWEEPING FLOORS OR CARRYING GROCERIES: YES
CAN YOU HAVE SEXUAL RELATIONS: YES
CAN YOU DO YARD WORK LIKE RAKING LEAVES, WEEDING OR PUSHING A MOWER: YES
CAN YOU WALK INDOORS, SUCH AS AROUND YOUR HOUSE: YES
CAN YOU TAKE CARE OF YOURSELF (EAT, DRESS, BATHE, OR USE TOILET): YES
DASI METS SCORE: 9.9
CAN YOU PARTICIPATE IN MODERATE RECREATIONAL ACTIVITIES LIKE GOLF, BOWLING, DANCING, DOUBLES TENNIS OR THROWING A BASEBALL OR FOOTBALL: YES
CAN YOU WALK A BLOCK OR TWO ON LEVEL GROUND: YES
CAN YOU CLIMB A FLIGHT OF STAIRS OR WALK UP A HILL: YES
CAN YOU RUN A SHORT DISTANCE: YES
CAN YOU DO HEAVY WORK AROUND THE HOUSE LIKE SCRUBBING FLOORS OR LIFTING AND MOVING HEAVY FURNITURE: YES
TOTAL_SCORE: 58.2
CAN YOU DO LIGHT WORK AROUND THE HOUSE LIKE DUSTING OR WASHING DISHES: YES

## 2025-07-18 ASSESSMENT — ENCOUNTER SYMPTOMS
MUSCULOSKELETAL NEGATIVE: 1
CONSTITUTIONAL NEGATIVE: 1
NEUROLOGICAL NEGATIVE: 1
RESPIRATORY NEGATIVE: 1
PSYCHIATRIC NEGATIVE: 1
EYES NEGATIVE: 1
CARDIOVASCULAR NEGATIVE: 1
GASTROINTESTINAL NEGATIVE: 1
ENDOCRINE NEGATIVE: 1
HEMATOLOGIC/LYMPHATIC NEGATIVE: 1

## 2025-07-18 NOTE — H&P (VIEW-ONLY)
"CPM/PAT Evaluation       Name: Magda Negron (Magda Negron \"Jamil\")  /Age: 1995/ y.o.     In-Person       Chief Complaint: irregular menstrual periods, painful intercourse    HPI    Pt is a 29 year old female with irregular menstrual periods and painful intercourse. Pt reports she started to experience irregular menstrual periods about one year ago and she started having occasional pain with intercourse about 3 months ago. Pt stated she does experience some abdominal cramping during her periods. Pt denies abnormal vaginal discharge or bleeding with intercourse. Pt completed US imaging that showed: No acute pelvic pathology.  Suggestion of possible septate uterus which could be definitely  characterized by hysterosalpingogram. Pt was examined by her OB-GYN and has been scheduled for laparoscopic excision and fulguration of uterus lesion and hysteroscopy,  dilation and curettage. Pt denies CP, SOB, or dizziness.     Past Medical History:   Diagnosis Date    Allergic     Allergy to peanuts     History of peanut allergy    Allergy to seafood     History of allergy to seafood    Anxiety     Asthma     Chlamydia     Depression     GERD (gastroesophageal reflux disease)     Headache     BRUCE (iron deficiency anemia)     Kidney stone     Kidney stone     Obesity     PCOS (polycystic ovarian syndrome)     Sleep apnea     Suppurative hidradenitis      Past Surgical History:   Procedure Laterality Date    KIDNEY STONE SURGERY      OTHER SURGICAL HISTORY  2018    Treatment Of Elbow Fracture    TYMPANOSTOMY TUBE PLACEMENT      UPPER GASTROINTESTINAL ENDOSCOPY      WISDOM TOOTH EXTRACTION       Social History     Tobacco Use    Smoking status: Former     Current packs/day: 0.00     Average packs/day: 1 pack/day for 11.1 years (11.1 ttl pk-yrs)     Types: Cigarettes     Start date:      Quit date: 2024     Years since quittin.4     Passive exposure: Current    Smokeless tobacco: Never "   Substance Use Topics    Alcohol use: Not Currently     Social History     Substance and Sexual Activity   Drug Use Never     Patient  reports being sexually active and has had partner(s) who are male. She reports using the following method of birth control/protection: Implant.    Family History[1]    Allergies   Allergen Reactions    Peanut Anaphylaxis    Penicillin Anaphylaxis and Hives     PT UNSURE IF HAD ANAPHYLAXIS, IT WAS SO LONG AGO    Penicillins Hives, Rash and Nausea/vomiting    Amoxicillin Unknown     SEVERITY UNKNOWN    Shellfish Derived Hives     SEVERITY UNKNOWN     Current Outpatient Medications   Medication Sig Dispense Refill    acetaminophen (Tylenol) 500 mg tablet Take 1 tablet (500 mg) by mouth every 6 hours if needed for mild pain (1 - 3).      albuterol (Ventolin HFA) 90 mcg/actuation inhaler Inhale 2 puffs every 4 hours if needed for wheezing or shortness of breath. 8 g 11    albuterol 2.5 mg /3 mL (0.083 %) nebulizer solution Take 3 mL by nebulization every 8 hours if needed.      ALPRAZolam (Xanax) 0.25 mg tablet Take 1 tablet (0.25 mg) by mouth once daily. (Patient taking differently: Take 1 tablet (0.25 mg) by mouth if needed for anxiety.)      buPROPion XL (Wellbutrin XL) 150 mg 24 hr tablet Take 1 tablet (150 mg) by mouth once daily in the morning. Do not crush, chew, or split. 90 tablet 3    chlorhexidine (Hibiclens) 4 % external liquid Use as directed 473 mL 0    chlorhexidine (Peridex) 0.12 % solution Use as directed. 473 mL 0    EPINEPHrine (EpiPen) 0.3 mg/0.3 mL injection syringe as directed Injection as directed      ferrous sulfate 325 mg (65 mg elemental) tablet Take 1 tablet by mouth once daily with breakfast.      FLUoxetine (PROzac) 20 mg capsule Take 1 capsule (20 mg) by mouth once daily. 90 capsule 3    montelukast (Singulair) 10 mg tablet TAKE ONE TABLET BY MOUTH EVERY DAY 30 tablet 11    Nexplanon 68 mg implant implant 1 each by subdermal route 1 time.      omeprazole  (PriLOSEC) 40 mg DR capsule Take 1 capsule (40 mg) by mouth once daily in the morning. Take before meals. Do not crush or chew. 90 capsule 3    ondansetron ODT (Zofran-ODT) 8 mg disintegrating tablet TAKE ONE TABLET BY MOUTH EVERY 8 HOURS AS NEEDED FOR NAUSEA 20 tablet 1    tirzepatide 10 mg/0.5 mL pen injector Inject 10 mg under the skin every 7 days.      valACYclovir (Valtrex) 1 gram tablet TAKE ONE-HALF TABLET BY MOUTH EVERY DAY 75 tablet 0     No current facility-administered medications for this visit.       Prior to Admission medications   Medication Sig Start Date End Date Taking? Authorizing Provider   budesonide (Pulmicort) 0.25 mg/2 mL nebulizer solution Take 2 mL (0.25 mg) by nebulization once daily. Rinse mouth with water after use to reduce aftertaste and incidence of candidiasis. Do not swallow. 6/17/24 7/18/25 Yes DUSTIN Dueñas   acetaminophen (Tylenol) 500 mg tablet Take 1 tablet (500 mg) by mouth every 6 hours if needed for mild pain (1 - 3).    Historical Provider, MD   albuterol (Ventolin HFA) 90 mcg/actuation inhaler Inhale 2 puffs every 4 hours if needed for wheezing or shortness of breath. 8/26/24 8/26/25  DUSTIN Dueñas   albuterol 2.5 mg /3 mL (0.083 %) nebulizer solution Take 3 mL by nebulization every 8 hours if needed. 3/15/16   Historical Provider, MD   ALPRAZolam (Xanax) 0.25 mg tablet Take 1 tablet (0.25 mg) by mouth once daily.  Patient taking differently: Take 1 tablet (0.25 mg) by mouth if needed for anxiety. 2/16/23   Historical Provider, MD   buPROPion XL (Wellbutrin XL) 150 mg 24 hr tablet Take 1 tablet (150 mg) by mouth once daily in the morning. Do not crush, chew, or split. 4/29/25 4/29/26  DUSTIN Dueñas   chlorhexidine (Hibiclens) 4 % external liquid Use as directed 7/18/25   DUSTIN Steel   chlorhexidine (Peridex) 0.12 % solution Use as directed. 7/18/25   DUSTIN Steel   EPINEPHrine (EpiPen) 0.3 mg/0.3 mL  injection syringe as directed Injection as directed    Historical Provider, MD   ferrous sulfate 325 mg (65 mg elemental) tablet Take 1 tablet by mouth once daily with breakfast.    Historical Provider, MD   FLUoxetine (PROzac) 20 mg capsule Take 1 capsule (20 mg) by mouth once daily. 3/31/25 3/31/26  DUSTIN Dueñas   montelukast (Singulair) 10 mg tablet TAKE ONE TABLET BY MOUTH EVERY DAY 8/12/24   DUSTIN Dueñas   Nexplanon 68 mg implant implant 1 each by subdermal route 1 time. 8/21/23   Historical Provider, MD   omeprazole (PriLOSEC) 40 mg DR capsule Take 1 capsule (40 mg) by mouth once daily in the morning. Take before meals. Do not crush or chew. 4/29/25 4/29/26  DUSTIN Dueñas   ondansetron ODT (Zofran-ODT) 8 mg disintegrating tablet TAKE ONE TABLET BY MOUTH EVERY 8 HOURS AS NEEDED FOR NAUSEA 7/3/25   DUSTIN Dueñas   tirzepatide 10 mg/0.5 mL pen injector Inject 10 mg under the skin every 7 days.    Historical Provider, MD   valACYclovir (Valtrex) 1 gram tablet TAKE ONE-HALF TABLET BY MOUTH EVERY DAY 6/5/25   DUSTIN Alejandro   albuterol 90 mcg/actuation inhaler Inhale 2 puffs every 4 hours if needed for wheezing. 8/26/24 7/18/25  DUSTIN Dueñas   methylPREDNISolone (Medrol Dospak) 4 mg tablets Take as directed on package. 4/5/24 7/18/25  DUSTIN Dueñas      Review of Systems   Constitutional: Negative.    HENT: Negative.     Eyes: Negative.    Respiratory: Negative.     Cardiovascular: Negative.    Gastrointestinal: Negative.    Endocrine: Negative.    Genitourinary:  Positive for menstrual problem.        Painful intercourse   Musculoskeletal: Negative.    Skin: Negative.    Neurological: Negative.    Hematological: Negative.    Psychiatric/Behavioral: Negative.       Physical Exam  Vitals reviewed.   Constitutional:       Appearance: Normal appearance.   HENT:      Head: Normocephalic and atraumatic.      Nose: Nose normal.  "     Mouth/Throat:      Mouth: Mucous membranes are moist.      Pharynx: Oropharynx is clear.     Eyes:      Extraocular Movements: Extraocular movements intact.      Conjunctiva/sclera: Conjunctivae normal.      Pupils: Pupils are equal, round, and reactive to light.       Cardiovascular:      Rate and Rhythm: Normal rate and regular rhythm.      Pulses: Normal pulses.      Heart sounds: Normal heart sounds.   Pulmonary:      Effort: Pulmonary effort is normal.      Breath sounds: Normal breath sounds.   Abdominal:      Palpations: Abdomen is soft.      Tenderness: There is no abdominal tenderness. There is no guarding or rebound.     Musculoskeletal:         General: Normal range of motion.      Cervical back: Normal range of motion and neck supple.     Skin:     General: Skin is warm and dry.     Neurological:      General: No focal deficit present.      Mental Status: She is alert and oriented to person, place, and time. Mental status is at baseline.     Psychiatric:         Mood and Affect: Mood normal.         Behavior: Behavior normal.         Thought Content: Thought content normal.         Judgment: Judgment normal.        PAT AIRWAY:   Airway:     Mallampati::  II    TM distance::  >3 FB    Neck ROM::  Full  normal      Visit Vitals  /73   Pulse 86   Temp 36.1 °C (97 °F) (Temporal)   Resp 16   Ht 1.575 m (5' 2\")   Wt 80.7 kg (178 lb)   LMP  (LMP Unknown)   SpO2 100%   BMI 32.56 kg/m²   OB Status Implant   Smoking Status Former   BSA 1.88 m²     ASA: 2  CHADS: 1.9%  RCRI: 0.4%  Ariscat: 1.6%  DASI Risk Score      Flowsheet Row Pre-Admission Testing from 7/18/2025 in [facility]   Can you take care of yourself (eat, dress, bathe, or use toilet)?  2.75 filed at 07/18/2025 0746   Can you walk indoors, such as around your house? 1.75 filed at 07/18/2025 0746   Can you walk a block or two on level ground?  2.75 filed at 07/18/2025 0746   Can you climb a flight of stairs or walk up a hill? " 5.5 filed at 07/18/2025 0746   Can you run a short distance? 8 filed at 07/18/2025 0746   Can you do light work around the house like dusting or washing dishes? 2.7 filed at 07/18/2025 0746   Can you do moderate work around the house like vacuuming, sweeping floors or carrying groceries? 3.5 filed at 07/18/2025 0746   Can you do heavy work around the house like scrubbing floors or lifting and moving heavy furniture?  8 filed at 07/18/2025 0746   Can you do yard work like raking leaves, weeding or pushing a mower? 4.5 filed at 07/18/2025 0746   Can you have sexual relations? 5.25 filed at 07/18/2025 0746   Can you participate in moderate recreational activities like golf, bowling, dancing, doubles tennis or throwing a baseball or football? 6 filed at 07/18/2025 0746   Can you participate in strenous sports like swimming, singles tennis, football, basketball, or skiing? 7.5 filed at 07/18/2025 0746   DASI SCORE 58.2 filed at 07/18/2025 0746   METS Score (Will be calculated only when all the questions are answered) 9.9 filed at 07/18/2025 0746     Caprini DVT Assessment    No data to display  Modified Frailty Index    No data to display  [id number] Stroke Risk Points  Current as of just now        N/A 0 to 9 Points:      Last Change: N/A          The [id number] risk score (Lip GH, et al. 2009. © 2010 [employer]) quantifies the risk of stroke for a patient with atrial fibrillation. For patients without atrial fibrillation or under the age of 18 this score appears as N/A. Higher score values generally indicate higher risk of stroke.        This score is not applicable to this patient. Components are not calculated.          Revised Cardiac Risk Index      Flowsheet Row Pre-Admission Testing from 7/18/2025 in [facility]   High-Risk Surgery (Intraperitoneal, Intrathoracic,Suprainguinal vascular) 0 filed at 07/18/2025 0821   History of ischemic heart disease (History of MI,  History of positive exercuse test, Current chest paint considered due to myocardial ischemia, Use of nitrate therapy, ECG with pathological Q Waves) 0 filed at 07/18/2025 0821   History of congestive heart failure (pulmonary edemia, bilateral rales or S3 gallop, Paroxysmal nocturnal dyspnea, CXR showing pulmonary vascular redistribution) 0 filed at 07/18/2025 0821   History of cerebrovascular disease (Prior TIA or stroke) 0 filed at 07/18/2025 0821   Pre-operative insulin treatment 0 filed at 07/18/2025 0821   Pre-operative creatinine>2 mg/dl 0 filed at 07/18/2025 0821   Revised Cardiac Risk Calculator 0 filed at 07/18/2025 0821     Apfel Simplified Score    No data to display  Risk Analysis Index Results This Encounter    No data found in the last 10 encounters.       Stop Bang Score      Flowsheet Row Pre-Admission Testing from 7/18/2025 in [facility]   Do you snore loudly? 0 filed at 07/18/2025 0746   Do you often feel tired or fatigued after your sleep? 0 filed at 07/18/2025 0746   Has anyone ever observed you stop breathing in your sleep? 0 filed at 07/18/2025 0746   Do you have or are you being treated for high blood pressure? 0 filed at 07/18/2025 0746   Recent BMI (Calculated) 32.6 filed at 07/18/2025 0746   Is BMI greater than 35 kg/m2? 0=No filed at 07/18/2025 0746   Age older than 50 years old? 0=No filed at 07/18/2025 0746   Is your neck circumference greater than 17 inches (Male) or 16 inches (Female)? 0 filed at 07/18/2025 0746   Gender - Male 0=No filed at 07/18/2025 0746   STOP-BANG Total Score 0 filed at 07/18/2025 0746     Prodigy: High Risk  Total Score: 0          ARISCAT Score for Postoperative Pulmonary Complications    No data to display  Donis Perioperative Risk for Myocardial Infarction or Cardiac Arrest (ERICH)    No data to display      Assessment and Plan:     Dysmenorrhea, Menorrhagia with irregular cycle, Dyspareunia in female, Bicornuate uterus: Laparoscopic Excision and  Fulguration Lesion Uterus, Hysteroscopy, Dilation and Curettage.   GERD: Pt is taking omeprazole.   PCOS  Anxiety/Depression: pt is taking bupropion XL and fluoxetine.   Asthma: Pt stated she has allergy induced asthma. Pt stated she uses her inhaler about 1-2 times per day. Pt is taking Singulair. Pt denies SOB.   Mild ITZEL: pt stated she was told she did not need to use a CPAP.   Iron Deficiency anemia: pt is taking ferrous sulfate supplements. Ordered CBC in PAT.   Hidradenitis Suppurative: no active lesions  BMI: 32.56; Pt is on tirzepatide for weight loss. Pt experiences some nausea and constipation with tirzepatide.       CBC, BMP, and T&S ordered in PAT.    KEYSHAWN Steel-CALI         [1]   Family History  Problem Relation Name Age of Onset    Hyperlipidemia Father Jeff boyd     Anxiety disorder Father Jeff boyd     Hypertension Father Jeff boyd     Asthma Maternal Grandmother Risa boyd     Diabetes Maternal Grandmother Risa boyd

## 2025-07-18 NOTE — PREPROCEDURE INSTRUCTIONS
Preoperative Fasting Guidelines    Why must I stop eating and drinking before surgery?  With anesthesia, food or liquid in your stomach can enter your lungs causing serious complications  GLP-1 medications can slow the movement of food through your stomach and intestines.  This further increases the risk of food entering your lungs with anesthesia  When do I need to stop eating and drinking before my surgery?  To help ensure food has passed out of your stomach, START a clear liquid diet 24 hours before your surgery  On the day of your surgery/procedure, take morning medications with a sip of water. Do not drink the last Ensure drink from [employer] kit due to nausea from GLP1 medication and GERD  A clear liquid diet consists of clear liquids and foods that melt into a clear liquid (i.e. gelatin) and excludes solid foods and liquids you cannot see through (i.e. milk). Clears can and should contain sugar to obtain a sufficient number of calories.  A clear liquid diet includes  Clear, fat-free broth  Clear nutritional drinks  Pulp-free popsicles, vegetable and fruit juice  Gelatin  Coffee and tea without creamer or milk  Clear soda and sports drinks    Diabetic Patients  Clear liquids should not be sugar-free   Check your blood glucose levels as you normally do  If you have symptoms of low blood glucose (shakiness, sweating, dizziness, confusion) or high blood glucose (dry mouth, excessive thirst, frequent urination, blurry vision), check your blood glucose level  For low blood glucose increase your consumption of sugar-containing clear liquid   For high blood glucose, decrease your consumption of sugar-containing clears and treat as you normally would  If symptoms persist seek medical attention    Examples of GLP-1 Medications  [facility] DISCHARGE INSTRUCTIONS    The Same Day Surgery (SDS) Department of the hospital where your  procedure will be performed will contact you after 2:00 PM the day before your surgery. If you are scheduled on a Monday, or a Tuesday following a Monday holiday, they will call on the last business day prior to your surgery.  Please check your voicemail for any missed messages.    [facility]  [address] [place] [address] [phone]  Second Floor      Please let your surgeon know if:      You develop any open sores, shingles, burning or painful urination as these may increase your risk of an infection.   You no longer wish to have the surgery.   Any other personal circumstances change that may lead to the need to cancel or defer this surgery-such as being sick or getting admitted to any hospital within one week of your planned procedure.    Your contact details change, such as a change of address or phone number.    Starting now:     Please DO NOT drink alcohol or smoke for 24 hours before surgery. It is well known that quitting smoking can make a huge difference to your health and recovery from surgery. The longer you abstain from smoking, the better your chances of a healthy recovery. If you need help with quitting, call 8-800-QUIT-NOW to be connected to a trained counselor who will discuss the best methods to help you quit.     Before your surgery:    Please stop all supplements 7 days prior to surgery. Or as directed by your surgeon.   Please stop taking NSAID pain medicine such as Advil and Motrin 7 days before surgery.    If you develop any fever, cough, cold, rashes, cuts, scratches, scrapes, urinary symptoms or infection anywhere on your body (including teeth and gums) prior to surgery, please call your surgeon’s office as soon as possible. This may require treatment to reduce the chance of cancellation on the day of surgery.  Please review and follow instructions provided for ERAS kit.    The day before your surgery:   DIET- Please follow the diet instructions at  the top of your packet.   Get a good night’s rest.  Use the special soap for bathing if you have been instructed to use one.    Scheduled surgery times may change and you will be notified if this occurs - please check your personal voicemail for any updates.     On the morning of surgery:   Wear comfortable, loose fitting clothes which open in the front. Please do not wear moisturizers, creams, lotions, makeup or perfume.    Please bring with you to surgery:   Photo ID and insurance card   Current list of medicines and allergies   Pacemaker/ Defibrillator/Heart stent cards   CPAP machine and mask    Slings/ splints/ crutches   A copy of your complete advanced directive/DHPOA.    Please do NOT bring with you to surgery:   All jewelry and valuables should be left at home.   Prosthetic devices such as contact lenses, hearing aids, dentures, eyelash extensions, hairpins and body piercings must be removed prior to going in to the surgical suite.    After outpatient surgery:   A responsible adult MUST accompany you at the time of discharge and stay with you for 24 hours after your surgery. You may NOT drive yourself home after surgery.    Do not drive, operate machinery, make critical decisions or do activities that require co-ordination or balance until after a night’s sleep.   Do not drink alcoholic beverages for 24 hours.   Instructions for resuming your medications will be provided by your surgeon.    CALL YOUR DOCTOR AFTER SURGERY IF YOU HAVE:     Chills and/or a fever of 101° F or higher.    Redness, swelling, pus or drainage from your surgical wound or a bad smell from the wound.    Lightheadedness, fainting or confusion.    Persistent vomiting (throwing up) and are not able to eat or drink for 12 hours.    Three or more loose, watery bowel movements in 24 hours (diarrhea).   Difficulty or pain while urinating( after non-urological surgery)    Pain and swelling in your legs, especially if it is only on one side.     Difficulty breathing or are breathing faster than normal.    Any new concerning symptoms.        Patient Information: Pre-Operative Infection Prevention Measures     Why did I have my nose, under my arms, and groin swabbed?  The purpose of the swab is to identify Staphylococcus aureus inside your nose or on your skin.  The swab was sent to the laboratory for culture.  A positive swab/culture for Staphylococcus aureus is called colonization or carriage.      What is Staphylococcus aureus?  Staphylococcus aureus, also known as “staph”, is a germ found on the skin or in the nose of healthy people.  Sometimes Staphylococcus aureus can get into the body and cause an infection.  This can be minor (such as pimples, boils, or other skin problems).  It might also be serious (such as a blood infection, pneumonia, or a surgical site infection).    What is Staphylococcus aureus colonization or carriage?  Colonization or carriage means that a person has the germ but is not sick from it.  These bacteria can be spread on the hands or when breathing or sneezing.    How is Staphylococcus aureus spread?  It is most often spread by close contact with a person or item that carries it.    What happens if my culture is positive for Staphylococcus aureus?  Your doctor/medical team will use this information to guide any antibiotic treatment which may be necessary.  Regardless of the culture results, we will clean the inside of your nose with a betadine swab just before you have your surgery.      Will I get an infection if I have Staphylococcus aureus in my nose or on my skin?  Anyone can get an infection with Staphylococcus aureus.  However, the best way to reduce your risk of infection is to follow the instructions provided to you for the use of your CHG soap and dental rinse.        Patient Information: Oral/Dental Rinse    What is oral/dental rinse?   It is a mouthwash. It is a way of cleaning the mouth with a germ-killing solution  before your surgery.  The solution contains chlorhexidine, commonly known as CHG.   It is used inside the mouth to kill a bacteria known as Staphylococcus aureus.  Let your doctor know if you are allergic to Chlorhexidine.    Why do I need to use CHG oral/dental rinse?  The CHG oral/dental rinse helps to kill a bacteria in your mouth known as Staphylococcus aureus.     This reduces the risk of infection at the surgical site.      Using your CHG oral/dental rinse  STEPS:  Use your CHG oral/dental rinse after you brush your teeth the night before (at bedtime) and the morning of your surgery.  Follow all directions on your prescription label.    Use the cap on the container to measure 15ml   Swish (gargle if you can) the mouthwash in your mouth for at least 30 seconds, (do not swallow) and spit out  After you use your CHG rinse, do not rinse your mouth with water, drink or eat.  Please refer to the prescription label for the appropriate time to resume oral intake      What side effects might I have using the CHG oral/dental rinse?  CHG rinse will stick to plaque on the teeth.  Brush and floss just before use.  Teeth brushing will help avoid staining of plaque during use.      Patient Information: Home Preoperative Antibacterial Shower      What is a home preoperative antibacterial shower?  This shower is a way of cleaning the skin with a germ-killing solution before surgery.  The solution contains chlorhexidine, commonly known as CHG.  CHG is a skin cleanser with germ-killing ability.  Let your doctor know if you are allergic to chlorhexidine.    Why do I need to take a preoperative antibacterial shower?  Skin is not sterile.  It is best to try to make your skin as free of germs as possible before surgery.  Proper cleansing with a germ-killing soap before surgery can lower the number of germs on your skin.  This helps to reduce the risk of infection at the surgical site.  Following the instructions listed below will  help you prepare your skin for surgery.      How do I use the solution?  Steps:  Begin using your CHG soap 5 days before your scheduled surgery on ________________________.    First, wash and rinse your hair using the CHG soap. Keep CHG soap away from ear canals and eyes.  Rinse completely, do not condition.  Hair extensions should be removed.  Wash your face with your normal soap and rinse.    Apply the CHG solution to a clean wet washcloth.  Turn the water off or move away from the water spray to avoid premature rinsing of the CHG soap as you are applying.   Firmly lather your entire body from the neck down.  Do not use on your face.  Pay special attention to the area(s) where your incision(s) will be located unless they are on your face.  Avoid scrubbing your skin too hard.  The important point is to have the CHG soap sit on your skin for 3 minutes.    When the 3 minutes are up, turn on the water and rinse the CHG solution off your body completely.   DO NOT wash with regular soap after you have used the CHG soap solution  Pat yourself dry with a clean, freshly-laundered towel.  DO NOT apply powders, deodorants, or lotions.  Dress in clean, freshly laundered nightclothes.    Be sure to sleep with clean, freshly laundered sheets.  Be aware that CHG will cause stains on fabrics; if you wash them with bleach after use.  Rinse your washcloth and other linens that have contact with CHG completely.  Use only non-chlorine detergents to launder the items used.   The morning of surgery is the fifth day.  Repeat the above steps and dress in clean comfortable clothing     Whom should I contact if I have any questions regarding the use of CHG soap?  Call the [facility] at [phone] if you have any questions.                    Medication List            Accurate as of July 18, 2025  7:55 AM. Always use your most recent med list.                 acetaminophen 500 mg tablet  Commonly known as: Tylenol  Medication Adjustments for Surgery: Take/Use as prescribed     * albuterol 2.5 mg /3 mL (0.083 %) nebulizer solution  Medication Adjustments for Surgery: Take/Use as prescribed     * albuterol 90 mcg/actuation inhaler  Commonly known as: Ventolin HFA  Inhale 2 puffs every 4 hours if needed for wheezing or shortness of breath.  Medication Adjustments for Surgery: Take/Use as prescribed     buPROPion  mg 24 hr tablet  Commonly known as: Wellbutrin XL  Take 1 tablet (150 mg) by mouth once daily in the morning. Do not crush, chew, or split.  Medication Adjustments for Surgery: Take/Use as prescribed     EpiPen 0.3 mg/0.3 mL injection syringe  Generic drug: EPINEPHrine  Medication Adjustments for Surgery: Take/Use as prescribed     ferrous sulfate 325 mg (65 mg elemental) tablet  Medication Adjustments for Surgery: Do Not take on the morning of surgery     FLUoxetine 20 mg capsule  Commonly known as: PROzac  Take 1 capsule (20 mg) by mouth once daily.  Medication Adjustments for Surgery: Take/Use as prescribed     montelukast 10 mg tablet  Commonly known as: Singulair  TAKE ONE TABLET BY MOUTH EVERY DAY  Medication Adjustments for Surgery: Take/Use as prescribed     Nexplanon 68 mg implant implant  Generic drug: etonogestrel-eluting contraceptive     omeprazole 40 mg DR capsule  Commonly known as: PriLOSEC  Take 1 capsule (40 mg) by mouth once daily in the morning. Take before meals. Do not crush or chew.  Medication Adjustments for Surgery: Take/Use as prescribed     ondansetron ODT 8 mg disintegrating tablet  Commonly known as: Zofran-ODT  TAKE ONE TABLET BY MOUTH EVERY 8 HOURS AS NEEDED FOR NAUSEA  Medication Adjustments for Surgery: Take/Use as prescribed     tirzepatide 10 mg/0.5 mL pen injector  Medication Adjustments for Surgery: Take/Use as prescribed     valACYclovir 1 gram tablet  Commonly known as: Valtrex  TAKE ONE-HALF TABLET BY MOUTH EVERY  DAY  Medication Adjustments for Surgery: Take/Use as prescribed     Xanax 0.25 mg tablet  Generic drug: ALPRAZolam  Medication Adjustments for Surgery: Take/Use as prescribed           * This list has 2 medication(s) that are the same as other medications prescribed for you. Read the directions carefully, and ask your doctor or other care provider to review them with you.

## 2025-07-18 NOTE — CPM/PAT H&P
"CPM/PAT Evaluation       Name: Magda Negron (Magda Negron \"Jamil\")  /Age: 1995/ y.o.     In-Person       Chief Complaint: irregular menstrual periods, painful intercourse    HPI    Pt is a 29 year old female with irregular menstrual periods and painful intercourse. Pt reports she started to experience irregular menstrual periods about one year ago and she started having occasional pain with intercourse about 3 months ago. Pt stated she does experience some abdominal cramping during her periods. Pt denies abnormal vaginal discharge or bleeding with intercourse. Pt completed US imaging that showed: No acute pelvic pathology.  Suggestion of possible septate uterus which could be definitely  characterized by hysterosalpingogram. Pt was examined by her OB-GYN and has been scheduled for laparoscopic excision and fulguration of uterus lesion and hysteroscopy,  dilation and curettage. Pt denies CP, SOB, or dizziness.     Past Medical History:   Diagnosis Date    Allergic     Allergy to peanuts     History of peanut allergy    Allergy to seafood     History of allergy to seafood    Anxiety     Asthma     Chlamydia     Depression     GERD (gastroesophageal reflux disease)     Headache     BRUCE (iron deficiency anemia)     Kidney stone     Kidney stone     Obesity     PCOS (polycystic ovarian syndrome)     Sleep apnea     Suppurative hidradenitis      Past Surgical History:   Procedure Laterality Date    KIDNEY STONE SURGERY      OTHER SURGICAL HISTORY  2018    Treatment Of Elbow Fracture    TYMPANOSTOMY TUBE PLACEMENT      UPPER GASTROINTESTINAL ENDOSCOPY      WISDOM TOOTH EXTRACTION       Social History     Tobacco Use    Smoking status: Former     Current packs/day: 0.00     Average packs/day: 1 pack/day for 11.1 years (11.1 ttl pk-yrs)     Types: Cigarettes     Start date:      Quit date: 2024     Years since quittin.4     Passive exposure: Current    Smokeless tobacco: Never "   Substance Use Topics    Alcohol use: Not Currently     Social History     Substance and Sexual Activity   Drug Use Never     Patient  reports being sexually active and has had partner(s) who are male. She reports using the following method of birth control/protection: Implant.    Family History[1]    Allergies   Allergen Reactions    Peanut Anaphylaxis    Penicillin Anaphylaxis and Hives     PT UNSURE IF HAD ANAPHYLAXIS, IT WAS SO LONG AGO    Penicillins Hives, Rash and Nausea/vomiting    Amoxicillin Unknown     SEVERITY UNKNOWN    Shellfish Derived Hives     SEVERITY UNKNOWN     Current Outpatient Medications   Medication Sig Dispense Refill    acetaminophen (Tylenol) 500 mg tablet Take 1 tablet (500 mg) by mouth every 6 hours if needed for mild pain (1 - 3).      albuterol (Ventolin HFA) 90 mcg/actuation inhaler Inhale 2 puffs every 4 hours if needed for wheezing or shortness of breath. 8 g 11    albuterol 2.5 mg /3 mL (0.083 %) nebulizer solution Take 3 mL by nebulization every 8 hours if needed.      ALPRAZolam (Xanax) 0.25 mg tablet Take 1 tablet (0.25 mg) by mouth once daily. (Patient taking differently: Take 1 tablet (0.25 mg) by mouth if needed for anxiety.)      buPROPion XL (Wellbutrin XL) 150 mg 24 hr tablet Take 1 tablet (150 mg) by mouth once daily in the morning. Do not crush, chew, or split. 90 tablet 3    chlorhexidine (Hibiclens) 4 % external liquid Use as directed 473 mL 0    chlorhexidine (Peridex) 0.12 % solution Use as directed. 473 mL 0    EPINEPHrine (EpiPen) 0.3 mg/0.3 mL injection syringe as directed Injection as directed      ferrous sulfate 325 mg (65 mg elemental) tablet Take 1 tablet by mouth once daily with breakfast.      FLUoxetine (PROzac) 20 mg capsule Take 1 capsule (20 mg) by mouth once daily. 90 capsule 3    montelukast (Singulair) 10 mg tablet TAKE ONE TABLET BY MOUTH EVERY DAY 30 tablet 11    Nexplanon 68 mg implant implant 1 each by subdermal route 1 time.      omeprazole  (PriLOSEC) 40 mg DR capsule Take 1 capsule (40 mg) by mouth once daily in the morning. Take before meals. Do not crush or chew. 90 capsule 3    ondansetron ODT (Zofran-ODT) 8 mg disintegrating tablet TAKE ONE TABLET BY MOUTH EVERY 8 HOURS AS NEEDED FOR NAUSEA 20 tablet 1    tirzepatide 10 mg/0.5 mL pen injector Inject 10 mg under the skin every 7 days.      valACYclovir (Valtrex) 1 gram tablet TAKE ONE-HALF TABLET BY MOUTH EVERY DAY 75 tablet 0     No current facility-administered medications for this visit.       Prior to Admission medications   Medication Sig Start Date End Date Taking? Authorizing Provider   budesonide (Pulmicort) 0.25 mg/2 mL nebulizer solution Take 2 mL (0.25 mg) by nebulization once daily. Rinse mouth with water after use to reduce aftertaste and incidence of candidiasis. Do not swallow. 6/17/24 7/18/25 Yes DUSTIN Dueñas   acetaminophen (Tylenol) 500 mg tablet Take 1 tablet (500 mg) by mouth every 6 hours if needed for mild pain (1 - 3).    Historical Provider, MD   albuterol (Ventolin HFA) 90 mcg/actuation inhaler Inhale 2 puffs every 4 hours if needed for wheezing or shortness of breath. 8/26/24 8/26/25  DUSTIN Dueñas   albuterol 2.5 mg /3 mL (0.083 %) nebulizer solution Take 3 mL by nebulization every 8 hours if needed. 3/15/16   Historical Provider, MD   ALPRAZolam (Xanax) 0.25 mg tablet Take 1 tablet (0.25 mg) by mouth once daily.  Patient taking differently: Take 1 tablet (0.25 mg) by mouth if needed for anxiety. 2/16/23   Historical Provider, MD   buPROPion XL (Wellbutrin XL) 150 mg 24 hr tablet Take 1 tablet (150 mg) by mouth once daily in the morning. Do not crush, chew, or split. 4/29/25 4/29/26  DUSTIN Dueñas   chlorhexidine (Hibiclens) 4 % external liquid Use as directed 7/18/25   DUSTIN Steel   chlorhexidine (Peridex) 0.12 % solution Use as directed. 7/18/25   DUSTIN Steel   EPINEPHrine (EpiPen) 0.3 mg/0.3 mL  injection syringe as directed Injection as directed    Historical Provider, MD   ferrous sulfate 325 mg (65 mg elemental) tablet Take 1 tablet by mouth once daily with breakfast.    Historical Provider, MD   FLUoxetine (PROzac) 20 mg capsule Take 1 capsule (20 mg) by mouth once daily. 3/31/25 3/31/26  DUSTIN Dueñas   montelukast (Singulair) 10 mg tablet TAKE ONE TABLET BY MOUTH EVERY DAY 8/12/24   DUSTIN Dueñas   Nexplanon 68 mg implant implant 1 each by subdermal route 1 time. 8/21/23   Historical Provider, MD   omeprazole (PriLOSEC) 40 mg DR capsule Take 1 capsule (40 mg) by mouth once daily in the morning. Take before meals. Do not crush or chew. 4/29/25 4/29/26  DUSTIN Dueñas   ondansetron ODT (Zofran-ODT) 8 mg disintegrating tablet TAKE ONE TABLET BY MOUTH EVERY 8 HOURS AS NEEDED FOR NAUSEA 7/3/25   DUSTIN Dueñas   tirzepatide 10 mg/0.5 mL pen injector Inject 10 mg under the skin every 7 days.    Historical Provider, MD   valACYclovir (Valtrex) 1 gram tablet TAKE ONE-HALF TABLET BY MOUTH EVERY DAY 6/5/25   DUSTIN Alejandro   albuterol 90 mcg/actuation inhaler Inhale 2 puffs every 4 hours if needed for wheezing. 8/26/24 7/18/25  DUSTIN Dueñas   methylPREDNISolone (Medrol Dospak) 4 mg tablets Take as directed on package. 4/5/24 7/18/25  DUSTIN Dueñas      Review of Systems   Constitutional: Negative.    HENT: Negative.     Eyes: Negative.    Respiratory: Negative.     Cardiovascular: Negative.    Gastrointestinal: Negative.    Endocrine: Negative.    Genitourinary:  Positive for menstrual problem.        Painful intercourse   Musculoskeletal: Negative.    Skin: Negative.    Neurological: Negative.    Hematological: Negative.    Psychiatric/Behavioral: Negative.       Physical Exam  Vitals reviewed.   Constitutional:       Appearance: Normal appearance.   HENT:      Head: Normocephalic and atraumatic.      Nose: Nose normal.  "     Mouth/Throat:      Mouth: Mucous membranes are moist.      Pharynx: Oropharynx is clear.     Eyes:      Extraocular Movements: Extraocular movements intact.      Conjunctiva/sclera: Conjunctivae normal.      Pupils: Pupils are equal, round, and reactive to light.       Cardiovascular:      Rate and Rhythm: Normal rate and regular rhythm.      Pulses: Normal pulses.      Heart sounds: Normal heart sounds.   Pulmonary:      Effort: Pulmonary effort is normal.      Breath sounds: Normal breath sounds.   Abdominal:      Palpations: Abdomen is soft.      Tenderness: There is no abdominal tenderness. There is no guarding or rebound.     Musculoskeletal:         General: Normal range of motion.      Cervical back: Normal range of motion and neck supple.     Skin:     General: Skin is warm and dry.     Neurological:      General: No focal deficit present.      Mental Status: She is alert and oriented to person, place, and time. Mental status is at baseline.     Psychiatric:         Mood and Affect: Mood normal.         Behavior: Behavior normal.         Thought Content: Thought content normal.         Judgment: Judgment normal.        PAT AIRWAY:   Airway:     Mallampati::  II    TM distance::  >3 FB    Neck ROM::  Full  normal      Visit Vitals  /73   Pulse 86   Temp 36.1 °C (97 °F) (Temporal)   Resp 16   Ht 1.575 m (5' 2\")   Wt 80.7 kg (178 lb)   LMP  (LMP Unknown)   SpO2 100%   BMI 32.56 kg/m²   OB Status Implant   Smoking Status Former   BSA 1.88 m²     ASA: 2  CHADS: 1.9%  RCRI: 0.4%  Ariscat: 1.6%  DASI Risk Score      Flowsheet Row [facility]   Can you take care of yourself (eat, dress, bathe, or use toilet)?  2.75 filed at 07/18/2025 0746   Can you walk indoors, such as around your house? 1.75 filed at 07/18/2025 0746   Can you walk a block or two on level ground?  2.75 filed at 07/18/2025 0746   Can you climb a flight of stairs or walk up a hill? " 5.5 filed at 07/18/2025 0746   Can you run a short distance? 8 filed at 07/18/2025 0746   Can you do light work around the house like dusting or washing dishes? 2.7 filed at 07/18/2025 0746   Can you do moderate work around the house like vacuuming, sweeping floors or carrying groceries? 3.5 filed at 07/18/2025 0746   Can you do heavy work around the house like scrubbing floors or lifting and moving heavy furniture?  8 filed at 07/18/2025 0746   Can you do yard work like raking leaves, weeding or pushing a mower? 4.5 filed at 07/18/2025 0746   Can you have sexual relations? 5.25 filed at 07/18/2025 0746   Can you participate in moderate recreational activities like golf, bowling, dancing, doubles tennis or throwing a baseball or football? 6 filed at 07/18/2025 0746   Can you participate in strenous sports like swimming, singles tennis, football, basketball, or skiing? 7.5 filed at 07/18/2025 0746   DASI SCORE 58.2 filed at 07/18/2025 0746   METS Score (Will be calculated only when all the questions are answered) 9.9 filed at 07/18/2025 0746     Caprini DVT Assessment    No data to display  Modified Frailty Index    No data to display  [id number] Stroke Risk Points  Current as of just now        N/A 0 to 9 Points:      Last Change: N/A          The [id number] risk score (Lip GH, et al. 2009. © 2010 [employer]) quantifies the risk of stroke for a patient with atrial fibrillation. For patients without atrial fibrillation or under the age of 18 this score appears as N/A. Higher score values generally indicate higher risk of stroke.        This score is not applicable to this patient. Components are not calculated.          Revised Cardiac Risk Index      Flowsheet Row Pre-Admission Testing from 7/18/2025 in [facility]   High-Risk Surgery (Intraperitoneal, Intrathoracic,Suprainguinal vascular) 0 filed at 07/18/2025 0821   History of ischemic heart disease (History of MI,  History of positive exercuse test, Current chest paint considered due to myocardial ischemia, Use of nitrate therapy, ECG with pathological Q Waves) 0 filed at 07/18/2025 0821   History of congestive heart failure (pulmonary edemia, bilateral rales or S3 gallop, Paroxysmal nocturnal dyspnea, CXR showing pulmonary vascular redistribution) 0 filed at 07/18/2025 0821   History of cerebrovascular disease (Prior TIA or stroke) 0 filed at 07/18/2025 0821   Pre-operative insulin treatment 0 filed at 07/18/2025 0821   Pre-operative creatinine>2 mg/dl 0 filed at 07/18/2025 0821   Revised Cardiac Risk Calculator 0 filed at 07/18/2025 0821     Apfel Simplified Score    No data to display  Risk Analysis Index Results This Encounter    No data found in the last 10 encounters.       Stop Bang Score      Flowsheet Row Pre-Admission Testing from 7/18/2025 in [facility]   Do you snore loudly? 0 filed at 07/18/2025 0746   Do you often feel tired or fatigued after your sleep? 0 filed at 07/18/2025 0746   Has anyone ever observed you stop breathing in your sleep? 0 filed at 07/18/2025 0746   Do you have or are you being treated for high blood pressure? 0 filed at 07/18/2025 0746   Recent BMI (Calculated) 32.6 filed at 07/18/2025 0746   Is BMI greater than 35 kg/m2? 0=No filed at 07/18/2025 0746   Age older than 50 years old? 0=No filed at 07/18/2025 0746   Is your neck circumference greater than 17 inches (Male) or 16 inches (Female)? 0 filed at 07/18/2025 0746   Gender - Male 0=No filed at 07/18/2025 0746   STOP-BANG Total Score 0 filed at 07/18/2025 0746     Prodigy: High Risk  Total Score: 0          ARISCAT Score for Postoperative Pulmonary Complications    No data to display  Donis Perioperative Risk for Myocardial Infarction or Cardiac Arrest (ERICH)    No data to display      Assessment and Plan:     Dysmenorrhea, Menorrhagia with irregular cycle, Dyspareunia in female, Bicornuate uterus: Laparoscopic Excision and  Fulguration Lesion Uterus, Hysteroscopy, Dilation and Curettage.   GERD: Pt is taking omeprazole.   PCOS  Anxiety/Depression: pt is taking bupropion XL and fluoxetine.   Asthma: Pt stated she has allergy induced asthma. Pt stated she uses her inhaler about 1-2 times per day. Pt is taking Singulair. Pt denies SOB.   Mild ITZEL: pt stated she was told she did not need to use a CPAP.   Iron Deficiency anemia: pt is taking ferrous sulfate supplements. Ordered CBC in PAT.   Hidradenitis Suppurative: no active lesions  BMI: 32.56; Pt is on tirzepatide for weight loss. Pt experiences some nausea and constipation with tirzepatide.       CBC, BMP, and T&S ordered in PAT.    KEYSHAWN Steel-CALI         [1]   Family History  Problem Relation Name Age of Onset    Hyperlipidemia Father Jeff boyd     Anxiety disorder Father Jeff boyd     Hypertension Father Jeff boyd     Asthma Maternal Grandmother Risa boyd     Diabetes Maternal Grandmother Risa boyd

## 2025-07-20 LAB — STAPHYLOCOCCUS SPEC CULT: NORMAL

## 2025-07-28 ENCOUNTER — TELEPHONE (OUTPATIENT)
Facility: CLINIC | Age: 30
End: 2025-07-28
Payer: COMMERCIAL

## 2025-07-28 NOTE — TELEPHONE ENCOUNTER
Pt called and states that she has just started her period, 07/28/25, and would like to verify that this would not affect her surgery on 07/31/25.

## 2025-07-30 ENCOUNTER — PATIENT MESSAGE (OUTPATIENT)
Dept: PRIMARY CARE | Facility: CLINIC | Age: 30
End: 2025-07-30
Payer: COMMERCIAL

## 2025-07-30 DIAGNOSIS — R06.2 WHEEZING: ICD-10-CM

## 2025-07-31 ENCOUNTER — ANESTHESIA EVENT (OUTPATIENT)
Dept: OPERATING ROOM | Facility: HOSPITAL | Age: 30
End: 2025-07-31
Payer: COMMERCIAL

## 2025-07-31 ENCOUNTER — ANESTHESIA (OUTPATIENT)
Dept: OPERATING ROOM | Facility: HOSPITAL | Age: 30
End: 2025-07-31
Payer: COMMERCIAL

## 2025-07-31 ENCOUNTER — PHARMACY VISIT (OUTPATIENT)
Dept: PHARMACY | Facility: CLINIC | Age: 30
End: 2025-07-31
Payer: COMMERCIAL

## 2025-07-31 ENCOUNTER — HOSPITAL ENCOUNTER (OUTPATIENT)
Facility: HOSPITAL | Age: 30
Setting detail: OUTPATIENT SURGERY
Discharge: HOME | End: 2025-07-31
Attending: OBSTETRICS & GYNECOLOGY | Admitting: OBSTETRICS & GYNECOLOGY
Payer: COMMERCIAL

## 2025-07-31 VITALS
RESPIRATION RATE: 15 BRPM | TEMPERATURE: 98.1 F | DIASTOLIC BLOOD PRESSURE: 88 MMHG | HEART RATE: 96 BPM | SYSTOLIC BLOOD PRESSURE: 137 MMHG | OXYGEN SATURATION: 100 %

## 2025-07-31 DIAGNOSIS — N80.9 ENDOMETRIOSIS DETERMINED BY LAPAROSCOPY: ICD-10-CM

## 2025-07-31 DIAGNOSIS — N92.1 MENORRHAGIA WITH IRREGULAR CYCLE: ICD-10-CM

## 2025-07-31 DIAGNOSIS — N94.10 DYSPAREUNIA IN FEMALE: ICD-10-CM

## 2025-07-31 DIAGNOSIS — G89.18 ACUTE POSTOPERATIVE PAIN: ICD-10-CM

## 2025-07-31 DIAGNOSIS — Q51.3 BICORNUATE UTERUS: ICD-10-CM

## 2025-07-31 DIAGNOSIS — N94.6 DYSMENORRHEA: Primary | ICD-10-CM

## 2025-07-31 PROBLEM — E66.9 OBESITY: Status: ACTIVE | Noted: 2025-07-31

## 2025-07-31 PROBLEM — G47.33 OSA (OBSTRUCTIVE SLEEP APNEA): Status: ACTIVE | Noted: 2025-07-31

## 2025-07-31 PROBLEM — F32.A DEPRESSION: Status: ACTIVE | Noted: 2025-07-31

## 2025-07-31 LAB — PREGNANCY TEST URINE, POC: NEGATIVE

## 2025-07-31 PROCEDURE — 7100000009 HC PHASE TWO TIME - INITIAL BASE CHARGE: Performed by: OBSTETRICS & GYNECOLOGY

## 2025-07-31 PROCEDURE — 81025 URINE PREGNANCY TEST: CPT | Performed by: OBSTETRICS & GYNECOLOGY

## 2025-07-31 PROCEDURE — 7100000002 HC RECOVERY ROOM TIME - EACH INCREMENTAL 1 MINUTE: Performed by: OBSTETRICS & GYNECOLOGY

## 2025-07-31 PROCEDURE — 2500000001 HC RX 250 WO HCPCS SELF ADMINISTERED DRUGS (ALT 637 FOR MEDICARE OP): Performed by: OBSTETRICS & GYNECOLOGY

## 2025-07-31 PROCEDURE — 7100000001 HC RECOVERY ROOM TIME - INITIAL BASE CHARGE: Performed by: OBSTETRICS & GYNECOLOGY

## 2025-07-31 PROCEDURE — 7100000010 HC PHASE TWO TIME - EACH INCREMENTAL 1 MINUTE: Performed by: OBSTETRICS & GYNECOLOGY

## 2025-07-31 PROCEDURE — 58558 HYSTEROSCOPY BIOPSY: CPT | Performed by: OBSTETRICS & GYNECOLOGY

## 2025-07-31 PROCEDURE — 3600000008 HC OR TIME - EACH INCREMENTAL 1 MINUTE - PROCEDURE LEVEL THREE: Performed by: OBSTETRICS & GYNECOLOGY

## 2025-07-31 PROCEDURE — 3600000003 HC OR TIME - INITIAL BASE CHARGE - PROCEDURE LEVEL THREE: Performed by: OBSTETRICS & GYNECOLOGY

## 2025-07-31 PROCEDURE — 2500000004 HC RX 250 GENERAL PHARMACY W/ HCPCS (ALT 636 FOR OP/ED): Mod: JZ | Performed by: ANESTHESIOLOGY

## 2025-07-31 PROCEDURE — 88305 TISSUE EXAM BY PATHOLOGIST: CPT | Mod: TC,TRILAB | Performed by: OBSTETRICS & GYNECOLOGY

## 2025-07-31 PROCEDURE — 2720000007 HC OR 272 NO HCPCS: Performed by: OBSTETRICS & GYNECOLOGY

## 2025-07-31 PROCEDURE — 2500000004 HC RX 250 GENERAL PHARMACY W/ HCPCS (ALT 636 FOR OP/ED): Performed by: OBSTETRICS & GYNECOLOGY

## 2025-07-31 PROCEDURE — 3700000001 HC GENERAL ANESTHESIA TIME - INITIAL BASE CHARGE: Performed by: OBSTETRICS & GYNECOLOGY

## 2025-07-31 PROCEDURE — RXMED WILLOW AMBULATORY MEDICATION CHARGE

## 2025-07-31 PROCEDURE — 2500000004 HC RX 250 GENERAL PHARMACY W/ HCPCS (ALT 636 FOR OP/ED): Mod: JZ | Performed by: ANESTHESIOLOGIST ASSISTANT

## 2025-07-31 PROCEDURE — 3700000002 HC GENERAL ANESTHESIA TIME - EACH INCREMENTAL 1 MINUTE: Performed by: OBSTETRICS & GYNECOLOGY

## 2025-07-31 PROCEDURE — A4649 SURGICAL SUPPLIES: HCPCS | Performed by: OBSTETRICS & GYNECOLOGY

## 2025-07-31 PROCEDURE — 58662 LAPAROSCOPY EXCISE LESIONS: CPT | Performed by: OBSTETRICS & GYNECOLOGY

## 2025-07-31 RX ORDER — IBUPROFEN 600 MG/1
600 TABLET, FILM COATED ORAL EVERY 6 HOURS PRN
Qty: 20 TABLET | Refills: 0 | Status: SHIPPED | OUTPATIENT
Start: 2025-07-31

## 2025-07-31 RX ORDER — GABAPENTIN 600 MG/1
600 TABLET ORAL ONCE
Status: COMPLETED | OUTPATIENT
Start: 2025-07-31 | End: 2025-07-31

## 2025-07-31 RX ORDER — ALBUTEROL SULFATE 0.83 MG/ML
2.5 SOLUTION RESPIRATORY (INHALATION) ONCE AS NEEDED
Status: DISCONTINUED | OUTPATIENT
Start: 2025-07-31 | End: 2025-07-31 | Stop reason: HOSPADM

## 2025-07-31 RX ORDER — MIDAZOLAM HYDROCHLORIDE 1 MG/ML
INJECTION, SOLUTION INTRAMUSCULAR; INTRAVENOUS AS NEEDED
Status: DISCONTINUED | OUTPATIENT
Start: 2025-07-31 | End: 2025-07-31

## 2025-07-31 RX ORDER — LIDOCAINE HYDROCHLORIDE AND EPINEPHRINE 10; 10 UG/ML; MG/ML
INJECTION, SOLUTION INFILTRATION; PERINEURAL AS NEEDED
Status: DISCONTINUED | OUTPATIENT
Start: 2025-07-31 | End: 2025-07-31 | Stop reason: HOSPADM

## 2025-07-31 RX ORDER — ALBUTEROL SULFATE 90 UG/1
2 INHALANT RESPIRATORY (INHALATION) EVERY 4 HOURS PRN
Qty: 8 G | Refills: 11 | Status: SHIPPED | OUTPATIENT
Start: 2025-07-31 | End: 2026-07-31

## 2025-07-31 RX ORDER — LIDOCAINE HYDROCHLORIDE 20 MG/ML
INJECTION, SOLUTION INFILTRATION; PERINEURAL AS NEEDED
Status: DISCONTINUED | OUTPATIENT
Start: 2025-07-31 | End: 2025-07-31

## 2025-07-31 RX ORDER — SODIUM CHLORIDE, SODIUM LACTATE, POTASSIUM CHLORIDE, CALCIUM CHLORIDE 600; 310; 30; 20 MG/100ML; MG/100ML; MG/100ML; MG/100ML
100 INJECTION, SOLUTION INTRAVENOUS CONTINUOUS
Status: DISCONTINUED | OUTPATIENT
Start: 2025-07-31 | End: 2025-07-31 | Stop reason: HOSPADM

## 2025-07-31 RX ORDER — HYDROMORPHONE HYDROCHLORIDE 0.2 MG/ML
0.1 INJECTION INTRAMUSCULAR; INTRAVENOUS; SUBCUTANEOUS EVERY 5 MIN PRN
Status: DISCONTINUED | OUTPATIENT
Start: 2025-07-31 | End: 2025-07-31 | Stop reason: HOSPADM

## 2025-07-31 RX ORDER — HYDRALAZINE HYDROCHLORIDE 20 MG/ML
5 INJECTION INTRAMUSCULAR; INTRAVENOUS EVERY 30 MIN PRN
Status: DISCONTINUED | OUTPATIENT
Start: 2025-07-31 | End: 2025-07-31 | Stop reason: HOSPADM

## 2025-07-31 RX ORDER — ALPRAZOLAM 0.25 MG/1
0.25 TABLET ORAL AS NEEDED
Start: 2025-07-31

## 2025-07-31 RX ORDER — GABAPENTIN 300 MG/1
300 CAPSULE ORAL 3 TIMES DAILY PRN
Qty: 20 CAPSULE | Refills: 0 | Status: SHIPPED | OUTPATIENT
Start: 2025-07-31

## 2025-07-31 RX ORDER — FENTANYL CITRATE 50 UG/ML
INJECTION, SOLUTION INTRAMUSCULAR; INTRAVENOUS AS NEEDED
Status: DISCONTINUED | OUTPATIENT
Start: 2025-07-31 | End: 2025-07-31

## 2025-07-31 RX ORDER — ONDANSETRON HYDROCHLORIDE 2 MG/ML
4 INJECTION, SOLUTION INTRAVENOUS ONCE AS NEEDED
Status: DISCONTINUED | OUTPATIENT
Start: 2025-07-31 | End: 2025-07-31 | Stop reason: HOSPADM

## 2025-07-31 RX ORDER — LIDOCAINE HYDROCHLORIDE 10 MG/ML
0.1 INJECTION, SOLUTION INFILTRATION; PERINEURAL ONCE
Status: DISCONTINUED | OUTPATIENT
Start: 2025-07-31 | End: 2025-07-31 | Stop reason: HOSPADM

## 2025-07-31 RX ORDER — HYDROMORPHONE HYDROCHLORIDE 0.2 MG/ML
0.2 INJECTION INTRAMUSCULAR; INTRAVENOUS; SUBCUTANEOUS EVERY 5 MIN PRN
Status: DISCONTINUED | OUTPATIENT
Start: 2025-07-31 | End: 2025-07-31 | Stop reason: HOSPADM

## 2025-07-31 RX ORDER — ACETAMINOPHEN 325 MG/1
975 TABLET ORAL ONCE
Status: COMPLETED | OUTPATIENT
Start: 2025-07-31 | End: 2025-07-31

## 2025-07-31 RX ORDER — SODIUM CHLORIDE, SODIUM LACTATE, POTASSIUM CHLORIDE, CALCIUM CHLORIDE 600; 310; 30; 20 MG/100ML; MG/100ML; MG/100ML; MG/100ML
INJECTION, SOLUTION INTRAVENOUS CONTINUOUS PRN
Status: DISCONTINUED | OUTPATIENT
Start: 2025-07-31 | End: 2025-07-31

## 2025-07-31 RX ORDER — PROPOFOL 10 MG/ML
INJECTION, EMULSION INTRAVENOUS AS NEEDED
Status: DISCONTINUED | OUTPATIENT
Start: 2025-07-31 | End: 2025-07-31

## 2025-07-31 RX ORDER — ACETAMINOPHEN 500 MG
1000 TABLET ORAL EVERY 6 HOURS PRN
Qty: 30 TABLET | Refills: 0 | Status: SHIPPED | OUTPATIENT
Start: 2025-07-31

## 2025-07-31 RX ORDER — ROCURONIUM BROMIDE 10 MG/ML
INJECTION, SOLUTION INTRAVENOUS AS NEEDED
Status: DISCONTINUED | OUTPATIENT
Start: 2025-07-31 | End: 2025-07-31

## 2025-07-31 RX ORDER — KETOROLAC TROMETHAMINE 30 MG/ML
INJECTION, SOLUTION INTRAMUSCULAR; INTRAVENOUS AS NEEDED
Status: DISCONTINUED | OUTPATIENT
Start: 2025-07-31 | End: 2025-07-31

## 2025-07-31 RX ORDER — ONDANSETRON HYDROCHLORIDE 2 MG/ML
INJECTION, SOLUTION INTRAVENOUS AS NEEDED
Status: DISCONTINUED | OUTPATIENT
Start: 2025-07-31 | End: 2025-07-31

## 2025-07-31 RX ADMIN — FENTANYL CITRATE 50 MCG: 50 INJECTION, SOLUTION INTRAMUSCULAR; INTRAVENOUS at 09:50

## 2025-07-31 RX ADMIN — KETOROLAC TROMETHAMINE 30 MG: 30 INJECTION, SOLUTION INTRAMUSCULAR at 09:42

## 2025-07-31 RX ADMIN — FENTANYL CITRATE 50 MCG: 50 INJECTION, SOLUTION INTRAMUSCULAR; INTRAVENOUS at 09:43

## 2025-07-31 RX ADMIN — SODIUM CHLORIDE, POTASSIUM CHLORIDE, SODIUM LACTATE AND CALCIUM CHLORIDE: 600; 310; 30; 20 INJECTION, SOLUTION INTRAVENOUS at 09:17

## 2025-07-31 RX ADMIN — LIDOCAINE HYDROCHLORIDE 50 MG: 20 INJECTION, SOLUTION INFILTRATION; PERINEURAL at 09:23

## 2025-07-31 RX ADMIN — HYDROMORPHONE HYDROCHLORIDE 0.2 MG: 0.2 INJECTION, SOLUTION INTRAMUSCULAR; INTRAVENOUS; SUBCUTANEOUS at 11:14

## 2025-07-31 RX ADMIN — DEXAMETHASONE SODIUM PHOSPHATE 8 MG: 4 INJECTION, SOLUTION INTRAMUSCULAR; INTRAVENOUS at 09:42

## 2025-07-31 RX ADMIN — ROCURONIUM BROMIDE 50 MG: 10 INJECTION, SOLUTION INTRAVENOUS at 09:23

## 2025-07-31 RX ADMIN — MIDAZOLAM 2 MG: 1 INJECTION INTRAMUSCULAR; INTRAVENOUS at 09:17

## 2025-07-31 RX ADMIN — PROPOFOL 200 MG: 10 INJECTION, EMULSION INTRAVENOUS at 09:23

## 2025-07-31 RX ADMIN — ACETAMINOPHEN 975 MG: 325 TABLET ORAL at 07:38

## 2025-07-31 RX ADMIN — FENTANYL CITRATE 50 MCG: 50 INJECTION, SOLUTION INTRAMUSCULAR; INTRAVENOUS at 10:30

## 2025-07-31 RX ADMIN — ROCURONIUM BROMIDE 10 MG: 10 INJECTION, SOLUTION INTRAVENOUS at 10:04

## 2025-07-31 RX ADMIN — FENTANYL CITRATE 50 MCG: 50 INJECTION, SOLUTION INTRAMUSCULAR; INTRAVENOUS at 09:23

## 2025-07-31 RX ADMIN — ONDANSETRON 4 MG: 2 INJECTION, SOLUTION INTRAMUSCULAR; INTRAVENOUS at 09:42

## 2025-07-31 RX ADMIN — GABAPENTIN 600 MG: 600 TABLET, FILM COATED ORAL at 07:38

## 2025-07-31 RX ADMIN — SUGAMMADEX 200 MG: 100 INJECTION, SOLUTION INTRAVENOUS at 10:34

## 2025-07-31 SDOH — HEALTH STABILITY: MENTAL HEALTH: CURRENT SMOKER: 0

## 2025-07-31 ASSESSMENT — PAIN DESCRIPTION - DESCRIPTORS
DESCRIPTORS: CRAMPING

## 2025-07-31 ASSESSMENT — PAIN SCALES - GENERAL
PAINLEVEL_OUTOF10: 6
PAINLEVEL_OUTOF10: 2
PAINLEVEL_OUTOF10: 8
PAINLEVEL_OUTOF10: 0 - NO PAIN

## 2025-07-31 ASSESSMENT — PAIN - FUNCTIONAL ASSESSMENT
PAIN_FUNCTIONAL_ASSESSMENT: 0-10

## 2025-07-31 ASSESSMENT — PAIN DESCRIPTION - ORIENTATION: ORIENTATION: LOWER

## 2025-07-31 ASSESSMENT — COLUMBIA-SUICIDE SEVERITY RATING SCALE - C-SSRS
6. HAVE YOU EVER DONE ANYTHING, STARTED TO DO ANYTHING, OR PREPARED TO DO ANYTHING TO END YOUR LIFE?: NO
2. HAVE YOU ACTUALLY HAD ANY THOUGHTS OF KILLING YOURSELF?: NO
1. IN THE PAST MONTH, HAVE YOU WISHED YOU WERE DEAD OR WISHED YOU COULD GO TO SLEEP AND NOT WAKE UP?: NO

## 2025-07-31 ASSESSMENT — PAIN DESCRIPTION - LOCATION: LOCATION: PELVIS

## 2025-07-31 NOTE — ANESTHESIA PROCEDURE NOTES
Airway  Date/Time: 7/31/2025 9:26 AM  Reason: elective    Airway not difficult    Staffing  Performed: JUNIOR   Authorized by: Amilcar Pedraza MD    Performed by: JUNIOR Valle  Patient location during procedure: OR    Patient Condition  Indications for airway management: anesthesia  Patient position: sniffing  MILS not maintained throughout  Sedation level: deep     Final Airway Details   Preoxygenated: yes  Final airway type: endotracheal airway  Successful airway: ETT  Cuffed: yes   Successful intubation technique: direct laryngoscopy  Adjuncts used in placement: intubating stylet  Endotracheal tube insertion site: oral  Blade: Salvatore  Blade size: #3  ETT size (mm): 7.0  Cormack-Lehane Classification: grade I - full view of glottis  Placement verified by: capnometry   Cuff volume (mL): 8  Measured from: lips  ETT to lips (cm): 22  Number of attempts at approach: 1  Number of other approaches attempted: 0

## 2025-07-31 NOTE — ANESTHESIA POSTPROCEDURE EVALUATION
"Patient: Magda Negron \"Jamil\"    Procedure Summary       Date: 07/31/25 Room / Location: [facility] 05 / [facility]    Anesthesia Start: 0919 Anesthesia Stop: 1049    Procedures:       Laparoscopic Excision and Fulguration Lesion Uterus      Hysteroscopy, Dilation and Curettage Diagnosis:       Dysmenorrhea      Menorrhagia with irregular cycle      Dyspareunia in female      Bicornuate uterus      Endometriosis determined by laparoscopy      (Dysmenorrhea [N94.6])      (Menorrhagia with irregular cycle [N92.1])      (Dyspareunia in female [N94.10])      (Bicornuate uterus [Q51.3])    Surgeons: Janice Kovacs DO Responsible Provider: Amilcar Pedraza MD    Anesthesia Type: general ASA Status: 2            Anesthesia Type: general    Vitals Value Taken Time   /80 07/31/25 11:21   Temp 36 07/31/25 11:22   Pulse 85 07/31/25 11:21   Resp 14 07/31/25 11:21   SpO2 97 % 07/31/25 11:21   Vitals shown include unfiled device data.    Anesthesia Post Evaluation    Patient location during evaluation: PACU  Patient participation: complete - patient participated  Level of consciousness: awake  Pain management: adequate  Airway patency: patent  Cardiovascular status: acceptable  Respiratory status: acceptable  Hydration status: acceptable  Postoperative Nausea and Vomiting: none        There were no known notable events for this encounter.    "

## 2025-07-31 NOTE — OP NOTE
"Laparoscopic Excision and Fulguration Lesion Uterus, Hysteroscopy, Dilation and Curettage Operative Note     Date: 2025  OR Location: [facility]    Name: Magda Negron \"Jamil\", : 1995, Age: 29 y.o., MRN: [id number], Sex: female    Diagnosis  Pre-op Diagnosis      * Dysmenorrhea [N94.6]     * Menorrhagia with irregular cycle [N92.1]     * Dyspareunia in female [N94.10]     * Bicornuate uterus [Q51.3] Post-op Diagnosis     * Dysmenorrhea [N94.6]     * Menorrhagia with irregular cycle [N92.1]     * Dyspareunia in female [N94.10]     * Bicornuate uterus [Q51.3]     * Endometriosis determined by laparoscopy [N80.9]     Procedures  Laparoscopic Excision and Fulguration Lesion Uterus  74698 - GA LAPS FULG/EXC OVARY VISCERA/PERITONEAL SURFACE    Hysteroscopy, Dilation and Curettage  66364 - GA HYSTEROSCOPY BX ENDOMETRIUM&/POLYPC W/WO D&C      Surgeons      * Janice Kovacs - Primary    Resident/Fellow/Other Assistant:  Surgeons and Role:  * No surgeons found with a matching role *    Staff:   Circulator: Gato Decker Person: Symone ALVARADO: Medina    Anesthesia Staff: Anesthesiologist: Amilcar Pedraza MD  C-AA: JUNIOR Valle    Procedure Summary  Anesthesia: General  ASA: II  Estimated Blood Loss: 20 mL  Intra-op Medications:   Administrations occurring from 0845 to 1030 on 25:   Medication Name Total Dose   lidocaine-epinephrine (Xylocaine W/EPI) 1 %-1:100,000 injection 10 mL   dexAMETHasone (Decadron) 4 mg/mL 8 mg   fentaNYL PF 0.05 mg/mL 200 mcg   ketorolac (Toradol) 30 mg 30 mg   LR infusion Cannot be calculated   lidocaine (Xylocaine) injection 2 % 50 mg   midazolam (Versed) 1 mg/1 mL 2 mg   ondansetron 2 mg/mL 4 mg   propofol (Diprivan) injection 10 mg/mL 200 mg   rocuronium (ZeMuron) 50 mg/5 mL injection 60 mg              Anesthesia Record               Intraprocedure I/O Totals          Intake    LR infusion 900.00 mL    Total Intake 900 mL          Specimen:   ID Type Source Tests " "Collected by Time   1 : [facility] Tissue ENDOMETRIUM CURETTINGS SURGICAL PATHOLOGY EXAM Janice Kovacs,  7/31/2025 1028                 Drains and/or Catheters: straight cath at beginning of case    Tourniquet Times: NA        Implants: NA    Findings: bicornuate uterus, otherwise normal cavity.  Grade 1 endometriosis of bilateral uterosacral ligaments.  Ovaries and tubes freely mobile.    Indications: Magda Negron \"Jamil\" is an 29 y.o. female who is having surgery for Dysmenorrhea [N94.6]  Menorrhagia with irregular cycle [N92.1]  Dyspareunia in female [N94.10]  Bicornuate uterus [Q51.3].     The patient was seen in the preoperative area. The risks, benefits, complications, treatment options, non-operative alternatives, expected recovery and outcomes were discussed with the patient. The possibilities of reaction to medication, pulmonary aspiration, injury to surrounding structures, bleeding, recurrent infection, the need for additional procedures, failure to diagnose a condition, and creating a complication requiring transfusion or operation were discussed with the patient. The patient concurred with the proposed plan, giving informed consent.  The site of surgery was properly noted/marked if necessary per policy. The patient has been actively warmed in preoperative area. Preoperative antibiotics are not indicated. Venous thrombosis prophylaxis have been ordered including bilateral sequential compression devices    Procedure Details:     After review of informed consent patient was taken to the operating room where general anesthetic is administered and found be adequate.  Patient was prepped and draped in normal sterile fashion in a lithotomy position yellowfin stirrups.  Weighted speculum placed the posterior fornix of vagina the antilipid the cervix was grasped single-tooth tenaculum.  Uterus was sounded to 8 cm and Edis dilators then used to dilate the cervix to allow for passage of HUMI uterine " manipulator.    Attention was then turned to the patient's abdomen where a 5 mm incision was made after injecting the skin with 1% lidocaine with epi.  Attempted to pass the Veress needle but found that this tissue was very deep.  On multiple attempts to pass a Veress needle and using a walker 1 I still found that I was not entering peritoneum correctly.  At this time I switched to the left upper quadrant was then able to make the incision passed Veress needle with no difficulty was then able to successfully passed the 5 mm optical trocar without difficulty.  Survey of the pelvis reveals that I had not gotten into the peritoneum at the end local site.  Is then able to pass the trocar under direct visualization through without difficulty.  Survey of the pelvis reveals that there is some mild emphysema from injecting the peritoneal space but survey of the pelvic organs reveals that the uterus is bicornuate with normal-appearing ovaries with a corpus luteum on the left ovary tubes which are freely mobile.  There is significant mount of Joe-Masters lesions and scarring at the uterosacral ligaments bilaterally consistent with endometriosis.  Ureters run to this area and because of this I elected to not pulverize this.  Again the tubes look pretty clean and the ovaries are not affected.  At the end of this pneumoperitoneum was released and the incision at the midline fascia was closed using 2-0 Vicryl and the skin incisions were closed using 4-0 Monocryl and skin glue was applied.    Attention is then turned back to the patient's perineum where the manipulator is removed from the cervix and a MyoSure scope was advanced to the cavity.  Survey the cavity reveals that she does indeed have a bicornuate uterus with a decent septum at the top.  Otherwise the endometrium appears normal and both ostia visualized.  The scope was removed and sharp curettage was performed and copious amount tissue was removed for pathology.   Tenaculum site was hemostatic sponge lap needle counts correct x 2, patient taken recovery in stable condition.    Evidence of Infection: No   Complications:  None; patient tolerated the procedure well.    Disposition: PACU - hemodynamically stable.  Condition: stable         Task Performed by RNFA or Surgical Assistant:  Camera and tissue manipulation, incision closure          Additional Details: NA    Attending Attestation: I performed the procedure.    Janice Kovacs  Phone Number: [phone]

## 2025-07-31 NOTE — ANESTHESIA PREPROCEDURE EVALUATION
"Patient: Magda Negron \"Negron\"    Procedure Information       Date/Time: 07/31/25 0845    Procedures:       Laparoscopic Excision and Fulguration Lesion Uterus      Hysteroscopy, Dilation and Curettage    Location: [facility] / [facility]    Surgeons: Janice Kovacs DO        Medical History[1]     Relevant Problems   Cardiac   (+) Pure hyperglyceridemia      Pulmonary   (+) Asthma   (+) ITZEL (obstructive sleep apnea)      Neuro   (+) Anxiety   (+) Depression      GI   (+) Gastroesophageal reflux disease without esophagitis      /Renal   (+) Kidney stone      Endocrine   (+) Obesity      ID   (+) Acute bronchitis due to other specified organisms   (+) Herpes simplex type 1 infection   (+) Hidradenitis suppurativa   (+) Impetigo      GYN   (+) Menorrhagia with irregular cycle     Surgical History[2]   Clinical information reviewed:    Allergies  Meds   Med Hx  Surg Hx  OB Status  Fam Hx          NPO Detail:  NPO/Void Status  Date of Last Liquid: 07/30/25  Time of Last Liquid: 2200  Date of Last Solid: 07/29/25         Physical Exam    Airway  Mallampati: III  TM distance: >3 FB  Neck ROM: full     Cardiovascular    Dental    Pulmonary    Abdominal            Anesthesia Plan    History of general anesthesia?: yes  History of complications of general anesthesia?: no    ASA 2     general     The patient is not a current smoker.  Patient was not previously instructed to abstain from smoking on day of procedure.  Patient did not smoke on day of procedure.    intravenous induction   Postoperative pain plan includes opioids.  Anesthetic plan and risks discussed with patient.    Plan discussed with attending.           [1]   Past Medical History:  Diagnosis Date    Allergic     Allergy to peanuts     History of peanut allergy    Allergy to seafood     History of allergy to seafood    Anxiety     Asthma     Chlamydia     Depression     GERD (gastroesophageal reflux disease)     Headache     BRUCE (iron " deficiency anemia)     Kidney stone     Kidney stone     Obesity     PCOS (polycystic ovarian syndrome)     Sleep apnea     Suppurative hidradenitis    [2]   Past Surgical History:  Procedure Laterality Date    KIDNEY STONE SURGERY  2021    OTHER SURGICAL HISTORY  07/16/2018    Treatment Of Elbow Fracture    TYMPANOSTOMY TUBE PLACEMENT      UPPER GASTROINTESTINAL ENDOSCOPY      WISDOM TOOTH EXTRACTION  2022

## 2025-07-31 NOTE — DISCHARGE INSTRUCTIONS
Spotting is normal. Keep rajan pad on underwear. Should not be soaking through rajan pad in 1 hr or less.  You can shower after 48 hours. No bathing/swimming or anything in vagina (sex, tampons, douching) until cleared by surgeon.

## 2025-07-31 NOTE — POST-PROCEDURE NOTE
1158- pt brought back from pacu, verbal report received. Pt is alert and awake. Snack and drink given. Family brought back from waiting room.    1235- dc instructions given and explained to pt, family, and SO. All verbally understood. Pt tolerating snack and drink.    1237- pt up to BR at this time.    1240- pt getting dressed at this time with assistance of SO.     1243- volunteer at bedside with wheelchair for dc.

## 2025-08-01 ASSESSMENT — PAIN SCALES - GENERAL: PAINLEVEL_OUTOF10: 0 - NO PAIN

## 2025-08-05 LAB
LABORATORY COMMENT REPORT: NORMAL
PATH REPORT.FINAL DX SPEC: NORMAL
PATH REPORT.GROSS SPEC: NORMAL
PATH REPORT.RELEVANT HX SPEC: NORMAL
PATH REPORT.TOTAL CANCER: NORMAL

## 2025-08-08 ENCOUNTER — TELEPHONE (OUTPATIENT)
Facility: CLINIC | Age: 30
End: 2025-08-08
Payer: COMMERCIAL

## 2025-08-15 ENCOUNTER — APPOINTMENT (OUTPATIENT)
Facility: CLINIC | Age: 30
End: 2025-08-15
Payer: COMMERCIAL

## 2025-08-20 ENCOUNTER — OFFICE VISIT (OUTPATIENT)
Facility: CLINIC | Age: 30
End: 2025-08-20
Payer: COMMERCIAL

## 2025-08-20 VITALS
BODY MASS INDEX: 32.55 KG/M2 | DIASTOLIC BLOOD PRESSURE: 90 MMHG | WEIGHT: 176.9 LBS | SYSTOLIC BLOOD PRESSURE: 120 MMHG | HEIGHT: 62 IN

## 2025-08-20 DIAGNOSIS — N80.9 ENDOMETRIOSIS DETERMINED BY LAPAROSCOPY: ICD-10-CM

## 2025-08-20 DIAGNOSIS — Z30.09 FAMILY PLANNING COUNSELING: ICD-10-CM

## 2025-08-20 DIAGNOSIS — Z48.89 POSTOPERATIVE VISIT: Primary | ICD-10-CM

## 2025-08-20 PROCEDURE — 99213 OFFICE O/P EST LOW 20 MIN: CPT

## 2025-08-20 PROCEDURE — 99024 POSTOP FOLLOW-UP VISIT: CPT | Performed by: OBSTETRICS & GYNECOLOGY

## 2025-08-20 PROCEDURE — 3008F BODY MASS INDEX DOCD: CPT | Performed by: OBSTETRICS & GYNECOLOGY

## 2025-08-20 RX ORDER — ETONOGESTREL AND ETHINYL ESTRADIOL VAGINAL RING .015; .12 MG/D; MG/D
1 RING VAGINAL
Qty: 1 EACH | Refills: 11 | Status: SHIPPED | OUTPATIENT
Start: 2025-08-20 | End: 2026-08-20

## 2025-08-20 ASSESSMENT — ENCOUNTER SYMPTOMS
DEPRESSION: 0
LOSS OF SENSATION IN FEET: 0
OCCASIONAL FEELINGS OF UNSTEADINESS: 0

## 2025-08-20 ASSESSMENT — PATIENT HEALTH QUESTIONNAIRE - PHQ9
1. LITTLE INTEREST OR PLEASURE IN DOING THINGS: NOT AT ALL
2. FEELING DOWN, DEPRESSED OR HOPELESS: SEVERAL DAYS
SUM OF ALL RESPONSES TO PHQ9 QUESTIONS 1 AND 2: 1

## 2025-08-20 ASSESSMENT — PAIN SCALES - GENERAL: PAINLEVEL_OUTOF10: 0-NO PAIN

## 2025-08-31 DIAGNOSIS — J45.20 MILD INTERMITTENT ASTHMA WITHOUT COMPLICATION (HHS-HCC): ICD-10-CM

## 2025-09-03 RX ORDER — MONTELUKAST SODIUM 10 MG/1
10 TABLET ORAL DAILY
Qty: 30 TABLET | Refills: 11 | Status: SHIPPED | OUTPATIENT
Start: 2025-09-03

## (undated) DEVICE — Device

## (undated) DEVICE — DRAPE PACK, LAVH, W/ATTACHED LEGGINGS, W/POUCH, 100 X 114 IN, LF, STERILE

## (undated) DEVICE — DISSECTOR, KITTNER, PEANUT, 5MM

## (undated) DEVICE — TUBESET, HIGH FLOW II, 45 LITER PNEUMO SURE, DISP.

## (undated) DEVICE — GOWN, SURGICAL, SIRUS, NON REINFORCED, LARGE

## (undated) DEVICE — SUTURE, MONOCRYL, 4-0, 27 IN, PS-2, UNDYED

## (undated) DEVICE — GLOVE, SURGICAL, PROTEXIS PI , 5.5, PF, LF

## (undated) DEVICE — GLOVE, SURGICAL, PROTEXIS PI BLUE W/NEUTHERA, 6.0, PF, LF

## (undated) DEVICE — GLOVE, SURGICAL, PROTEXIS PI W/NEU-THERA, 6.0, PF, LF

## (undated) DEVICE — CATHETER, RED RUBBER 16FR

## (undated) DEVICE — POSITIONING SYSTEM, PAGAZZI, PATIENT

## (undated) DEVICE — TUBE SET, PNEUMOCLEAR, SMOKE EVACU, HIGH-FLOW

## (undated) DEVICE — ADHESIVE, SKIN, DERMABOND ADVANCED, 15CM, PEN-STYLE

## (undated) DEVICE — SOLUTION, IRRIGATION, X RX SODIUM CHL 0.9%, 1000ML BTL

## (undated) DEVICE — CATHETER TRAY, FOLEY, ALOETOUCH, 16FR, 10ML, W/ DRAINBAG

## (undated) DEVICE — SEAL SET, MYOSURE ROD LENS SCOPE , SINGLE USE

## (undated) DEVICE — GOWN, SURGICAL, REINFORCED, XLARGE, X-LONG, STERILE